# Patient Record
Sex: FEMALE | Race: WHITE | HISPANIC OR LATINO | URBAN - METROPOLITAN AREA
[De-identification: names, ages, dates, MRNs, and addresses within clinical notes are randomized per-mention and may not be internally consistent; named-entity substitution may affect disease eponyms.]

---

## 2017-01-10 ENCOUNTER — OUTPATIENT (OUTPATIENT)
Dept: OUTPATIENT SERVICES | Facility: HOSPITAL | Age: 47
LOS: 1 days | End: 2017-01-10
Payer: COMMERCIAL

## 2017-01-10 ENCOUNTER — APPOINTMENT (OUTPATIENT)
Dept: CT IMAGING | Facility: HOSPITAL | Age: 47
End: 2017-01-10

## 2017-01-10 DIAGNOSIS — K40.90 UNILATERAL INGUINAL HERNIA, WITHOUT OBSTRUCTION OR GANGRENE, NOT SPECIFIED AS RECURRENT: Chronic | ICD-10-CM

## 2017-01-10 DIAGNOSIS — J34.89 OTHER SPECIFIED DISORDERS OF NOSE AND NASAL SINUSES: ICD-10-CM

## 2017-01-10 PROCEDURE — 70491 CT SOFT TISSUE NECK W/DYE: CPT

## 2017-01-10 PROCEDURE — 70486 CT MAXILLOFACIAL W/O DYE: CPT

## 2017-03-17 ENCOUNTER — APPOINTMENT (OUTPATIENT)
Dept: ULTRASOUND IMAGING | Facility: HOSPITAL | Age: 47
End: 2017-03-17

## 2017-03-17 ENCOUNTER — OUTPATIENT (OUTPATIENT)
Dept: OUTPATIENT SERVICES | Facility: HOSPITAL | Age: 47
LOS: 1 days | End: 2017-03-17
Payer: COMMERCIAL

## 2017-03-17 DIAGNOSIS — K40.90 UNILATERAL INGUINAL HERNIA, WITHOUT OBSTRUCTION OR GANGRENE, NOT SPECIFIED AS RECURRENT: Chronic | ICD-10-CM

## 2017-03-17 DIAGNOSIS — E04.1 NONTOXIC SINGLE THYROID NODULE: ICD-10-CM

## 2017-03-17 PROCEDURE — 76536 US EXAM OF HEAD AND NECK: CPT

## 2018-01-19 ENCOUNTER — OUTPATIENT (OUTPATIENT)
Dept: OUTPATIENT SERVICES | Facility: HOSPITAL | Age: 48
LOS: 1 days | End: 2018-01-19
Payer: COMMERCIAL

## 2018-01-19 ENCOUNTER — APPOINTMENT (OUTPATIENT)
Dept: ULTRASOUND IMAGING | Facility: HOSPITAL | Age: 48
End: 2018-01-19
Payer: COMMERCIAL

## 2018-01-19 ENCOUNTER — APPOINTMENT (OUTPATIENT)
Dept: MAMMOGRAPHY | Facility: HOSPITAL | Age: 48
End: 2018-01-19
Payer: COMMERCIAL

## 2018-01-19 DIAGNOSIS — K40.90 UNILATERAL INGUINAL HERNIA, WITHOUT OBSTRUCTION OR GANGRENE, NOT SPECIFIED AS RECURRENT: Chronic | ICD-10-CM

## 2018-01-19 DIAGNOSIS — R92.2 INCONCLUSIVE MAMMOGRAM: ICD-10-CM

## 2018-01-19 DIAGNOSIS — Z00.8 ENCOUNTER FOR OTHER GENERAL EXAMINATION: ICD-10-CM

## 2018-01-19 PROCEDURE — 77066 DX MAMMO INCL CAD BI: CPT | Mod: 26

## 2018-01-19 PROCEDURE — 77066 DX MAMMO INCL CAD BI: CPT

## 2018-01-19 PROCEDURE — G0279: CPT | Mod: 26

## 2018-01-19 PROCEDURE — G0279: CPT

## 2018-01-19 PROCEDURE — 76641 ULTRASOUND BREAST COMPLETE: CPT | Mod: 26

## 2018-01-19 PROCEDURE — 76641 ULTRASOUND BREAST COMPLETE: CPT

## 2018-01-31 ENCOUNTER — RESULT REVIEW (OUTPATIENT)
Age: 48
End: 2018-01-31

## 2018-01-31 ENCOUNTER — OUTPATIENT (OUTPATIENT)
Dept: OUTPATIENT SERVICES | Facility: HOSPITAL | Age: 48
LOS: 1 days | End: 2018-01-31
Payer: COMMERCIAL

## 2018-01-31 DIAGNOSIS — K40.90 UNILATERAL INGUINAL HERNIA, WITHOUT OBSTRUCTION OR GANGRENE, NOT SPECIFIED AS RECURRENT: Chronic | ICD-10-CM

## 2018-01-31 DIAGNOSIS — R92.8 OTHER ABNORMAL AND INCONCLUSIVE FINDINGS ON DIAGNOSTIC IMAGING OF BREAST: ICD-10-CM

## 2018-01-31 DIAGNOSIS — N63.0 UNSPECIFIED LUMP IN UNSPECIFIED BREAST: ICD-10-CM

## 2018-01-31 PROCEDURE — 19083 BX BREAST 1ST LESION US IMAG: CPT

## 2018-01-31 PROCEDURE — A4648: CPT

## 2018-01-31 PROCEDURE — 88305 TISSUE EXAM BY PATHOLOGIST: CPT

## 2018-01-31 PROCEDURE — 19083 BX BREAST 1ST LESION US IMAG: CPT | Mod: LT

## 2018-01-31 PROCEDURE — 88305 TISSUE EXAM BY PATHOLOGIST: CPT | Mod: 26

## 2018-02-08 ENCOUNTER — EMERGENCY (EMERGENCY)
Facility: HOSPITAL | Age: 48
LOS: 1 days | Discharge: HOME | End: 2018-02-08
Admitting: EMERGENCY MEDICINE
Payer: COMMERCIAL

## 2018-02-08 DIAGNOSIS — Z98.890 OTHER SPECIFIED POSTPROCEDURAL STATES: ICD-10-CM

## 2018-02-08 DIAGNOSIS — M54.6 PAIN IN THORACIC SPINE: ICD-10-CM

## 2018-02-08 DIAGNOSIS — Z87.19 PERSONAL HISTORY OF OTHER DISEASES OF THE DIGESTIVE SYSTEM: ICD-10-CM

## 2018-02-08 DIAGNOSIS — K21.9 GASTRO-ESOPHAGEAL REFLUX DISEASE WITHOUT ESOPHAGITIS: ICD-10-CM

## 2018-02-08 DIAGNOSIS — K40.90 UNILATERAL INGUINAL HERNIA, WITHOUT OBSTRUCTION OR GANGRENE, NOT SPECIFIED AS RECURRENT: Chronic | ICD-10-CM

## 2018-02-08 DIAGNOSIS — Z88.8 ALLERGY STATUS TO OTHER DRUGS, MEDICAMENTS AND BIOLOGICAL SUBSTANCES: ICD-10-CM

## 2018-02-08 DIAGNOSIS — Z79.899 OTHER LONG TERM (CURRENT) DRUG THERAPY: ICD-10-CM

## 2018-02-08 DIAGNOSIS — Z78.9 OTHER SPECIFIED HEALTH STATUS: ICD-10-CM

## 2018-02-08 DIAGNOSIS — R07.89 OTHER CHEST PAIN: ICD-10-CM

## 2018-02-08 PROCEDURE — 99285 EMERGENCY DEPT VISIT HI MDM: CPT | Mod: 25

## 2018-02-08 PROCEDURE — 71046 X-RAY EXAM CHEST 2 VIEWS: CPT | Mod: 26

## 2018-02-08 PROCEDURE — 93010 ELECTROCARDIOGRAM REPORT: CPT

## 2018-02-09 PROCEDURE — 85730 THROMBOPLASTIN TIME PARTIAL: CPT

## 2018-02-09 PROCEDURE — 93005 ELECTROCARDIOGRAM TRACING: CPT

## 2018-02-09 PROCEDURE — 83690 ASSAY OF LIPASE: CPT

## 2018-02-09 PROCEDURE — 71046 X-RAY EXAM CHEST 2 VIEWS: CPT

## 2018-02-09 PROCEDURE — 96374 THER/PROPH/DIAG INJ IV PUSH: CPT

## 2018-02-09 PROCEDURE — 96361 HYDRATE IV INFUSION ADD-ON: CPT

## 2018-02-09 PROCEDURE — 80053 COMPREHEN METABOLIC PANEL: CPT

## 2018-02-09 PROCEDURE — 85027 COMPLETE CBC AUTOMATED: CPT

## 2018-02-09 PROCEDURE — 84484 ASSAY OF TROPONIN QUANT: CPT

## 2018-02-09 PROCEDURE — 99284 EMERGENCY DEPT VISIT MOD MDM: CPT | Mod: 25

## 2018-02-09 PROCEDURE — 82553 CREATINE MB FRACTION: CPT

## 2018-02-09 PROCEDURE — 85610 PROTHROMBIN TIME: CPT

## 2018-08-01 ENCOUNTER — APPOINTMENT (OUTPATIENT)
Dept: ULTRASOUND IMAGING | Facility: HOSPITAL | Age: 48
End: 2018-08-01
Payer: COMMERCIAL

## 2018-08-01 ENCOUNTER — OUTPATIENT (OUTPATIENT)
Dept: OUTPATIENT SERVICES | Facility: HOSPITAL | Age: 48
LOS: 1 days | End: 2018-08-01
Payer: COMMERCIAL

## 2018-08-01 DIAGNOSIS — Z00.8 ENCOUNTER FOR OTHER GENERAL EXAMINATION: ICD-10-CM

## 2018-08-01 DIAGNOSIS — K40.90 UNILATERAL INGUINAL HERNIA, WITHOUT OBSTRUCTION OR GANGRENE, NOT SPECIFIED AS RECURRENT: Chronic | ICD-10-CM

## 2018-08-01 PROCEDURE — 76642 ULTRASOUND BREAST LIMITED: CPT | Mod: 26,LT

## 2018-08-01 PROCEDURE — 76642 ULTRASOUND BREAST LIMITED: CPT

## 2018-08-17 ENCOUNTER — RESULT REVIEW (OUTPATIENT)
Age: 48
End: 2018-08-17

## 2018-08-17 ENCOUNTER — OUTPATIENT (OUTPATIENT)
Dept: OUTPATIENT SERVICES | Facility: HOSPITAL | Age: 48
LOS: 1 days | End: 2018-08-17
Payer: COMMERCIAL

## 2018-08-17 ENCOUNTER — APPOINTMENT (OUTPATIENT)
Dept: ULTRASOUND IMAGING | Facility: CLINIC | Age: 48
End: 2018-08-17
Payer: COMMERCIAL

## 2018-08-17 DIAGNOSIS — Z00.8 ENCOUNTER FOR OTHER GENERAL EXAMINATION: ICD-10-CM

## 2018-08-17 DIAGNOSIS — K40.90 UNILATERAL INGUINAL HERNIA, WITHOUT OBSTRUCTION OR GANGRENE, NOT SPECIFIED AS RECURRENT: Chronic | ICD-10-CM

## 2018-08-17 DIAGNOSIS — N63.20 UNSPECIFIED LUMP IN THE LEFT BREAST, UNSPECIFIED QUADRANT: ICD-10-CM

## 2018-08-17 PROCEDURE — 19083 BX BREAST 1ST LESION US IMAG: CPT | Mod: LT

## 2018-08-17 PROCEDURE — A4648: CPT

## 2018-08-17 PROCEDURE — 77065 DX MAMMO INCL CAD UNI: CPT | Mod: 26,LT

## 2018-08-17 PROCEDURE — 19083 BX BREAST 1ST LESION US IMAG: CPT

## 2018-08-17 PROCEDURE — 77065 DX MAMMO INCL CAD UNI: CPT

## 2018-10-11 ENCOUNTER — RESULT REVIEW (OUTPATIENT)
Age: 48
End: 2018-10-11

## 2019-01-15 ENCOUNTER — RESULT REVIEW (OUTPATIENT)
Age: 49
End: 2019-01-15

## 2019-02-01 ENCOUNTER — OUTPATIENT (OUTPATIENT)
Dept: OUTPATIENT SERVICES | Facility: HOSPITAL | Age: 49
LOS: 1 days | End: 2019-02-01
Payer: COMMERCIAL

## 2019-02-01 ENCOUNTER — APPOINTMENT (OUTPATIENT)
Dept: ULTRASOUND IMAGING | Facility: HOSPITAL | Age: 49
End: 2019-02-01
Payer: COMMERCIAL

## 2019-02-01 ENCOUNTER — APPOINTMENT (OUTPATIENT)
Dept: MAMMOGRAPHY | Facility: HOSPITAL | Age: 49
End: 2019-02-01
Payer: COMMERCIAL

## 2019-02-01 DIAGNOSIS — Z00.8 ENCOUNTER FOR OTHER GENERAL EXAMINATION: ICD-10-CM

## 2019-02-01 DIAGNOSIS — K40.90 UNILATERAL INGUINAL HERNIA, WITHOUT OBSTRUCTION OR GANGRENE, NOT SPECIFIED AS RECURRENT: Chronic | ICD-10-CM

## 2019-02-01 PROCEDURE — 76641 ULTRASOUND BREAST COMPLETE: CPT

## 2019-02-01 PROCEDURE — 76830 TRANSVAGINAL US NON-OB: CPT | Mod: 26

## 2019-02-01 PROCEDURE — 76856 US EXAM PELVIC COMPLETE: CPT | Mod: 26

## 2019-02-01 PROCEDURE — 76830 TRANSVAGINAL US NON-OB: CPT

## 2019-02-01 PROCEDURE — 77066 DX MAMMO INCL CAD BI: CPT | Mod: 26

## 2019-02-01 PROCEDURE — G0279: CPT | Mod: 26

## 2019-02-01 PROCEDURE — 76856 US EXAM PELVIC COMPLETE: CPT

## 2019-02-01 PROCEDURE — G0279: CPT

## 2019-02-01 PROCEDURE — 77066 DX MAMMO INCL CAD BI: CPT

## 2019-02-01 PROCEDURE — 76641 ULTRASOUND BREAST COMPLETE: CPT | Mod: 26

## 2020-01-20 ENCOUNTER — RESULT REVIEW (OUTPATIENT)
Age: 50
End: 2020-01-20

## 2020-01-22 ENCOUNTER — EMERGENCY (EMERGENCY)
Facility: HOSPITAL | Age: 50
LOS: 1 days | Discharge: HOME | End: 2020-01-22
Attending: EMERGENCY MEDICINE | Admitting: EMERGENCY MEDICINE
Payer: COMMERCIAL

## 2020-01-22 VITALS
DIASTOLIC BLOOD PRESSURE: 71 MMHG | HEART RATE: 78 BPM | SYSTOLIC BLOOD PRESSURE: 148 MMHG | HEIGHT: 64 IN | TEMPERATURE: 98 F | OXYGEN SATURATION: 98 % | RESPIRATION RATE: 16 BRPM | WEIGHT: 130.73 LBS

## 2020-01-22 DIAGNOSIS — K40.90 UNILATERAL INGUINAL HERNIA, WITHOUT OBSTRUCTION OR GANGRENE, NOT SPECIFIED AS RECURRENT: Chronic | ICD-10-CM

## 2020-01-22 LAB
APPEARANCE UR: CLEAR — SIGNIFICANT CHANGE UP
BACTERIA # UR AUTO: ABNORMAL /HPF
BILIRUB UR-MCNC: NEGATIVE — SIGNIFICANT CHANGE UP
COLOR SPEC: YELLOW — SIGNIFICANT CHANGE UP
COMMENT - URINE: SIGNIFICANT CHANGE UP
DIFF PNL FLD: ABNORMAL
EPI CELLS # UR: SIGNIFICANT CHANGE UP
GLUCOSE UR QL: NEGATIVE — SIGNIFICANT CHANGE UP
KETONES UR-MCNC: NEGATIVE — SIGNIFICANT CHANGE UP
LEUKOCYTE ESTERASE UR-ACNC: ABNORMAL
NITRITE UR-MCNC: NEGATIVE — SIGNIFICANT CHANGE UP
PH UR: 7 — SIGNIFICANT CHANGE UP (ref 5–8)
PROT UR-MCNC: NEGATIVE — SIGNIFICANT CHANGE UP
RBC CASTS # UR COMP ASSIST: ABNORMAL /HPF (ref 0–4)
SP GR SPEC: 1 — LOW (ref 1.01–1.02)
UROBILINOGEN FLD QL: NEGATIVE — SIGNIFICANT CHANGE UP
WBC UR QL: ABNORMAL /HPF (ref 0–5)

## 2020-01-22 PROCEDURE — 99283 EMERGENCY DEPT VISIT LOW MDM: CPT

## 2020-01-22 PROCEDURE — 99284 EMERGENCY DEPT VISIT MOD MDM: CPT

## 2020-01-22 PROCEDURE — 81001 URINALYSIS AUTO W/SCOPE: CPT

## 2020-01-22 PROCEDURE — 87086 URINE CULTURE/COLONY COUNT: CPT

## 2020-01-22 PROCEDURE — 87186 SC STD MICRODIL/AGAR DIL: CPT

## 2020-01-22 RX ORDER — PHENAZOPYRIDINE HCL 100 MG
1 TABLET ORAL
Qty: 6 | Refills: 0
Start: 2020-01-22 | End: 2020-01-23

## 2020-01-22 RX ORDER — PHENAZOPYRIDINE HCL 100 MG
100 TABLET ORAL ONCE
Refills: 0 | Status: COMPLETED | OUTPATIENT
Start: 2020-01-22 | End: 2020-01-22

## 2020-01-22 RX ADMIN — Medication 1 TABLET(S): at 19:34

## 2020-01-22 RX ADMIN — Medication 100 MILLIGRAM(S): at 19:34

## 2020-01-22 NOTE — ED PROVIDER NOTE - OBJECTIVE STATEMENT
49 year old female, PMHx of spinal surgery, chronic low back pain; presents to the ED complaining of dysuria x 2 days. Patient states she has been having dysuria for the last two days. Since this morning, she has had suprapubic pain, increased urinary frequency, and intermittent hematuria prompting her to come to the ED for evaluation. Pt denies fevers, chills, nausea, vomiting, diarrhea, new/changed back pain, or other complaints.

## 2020-01-22 NOTE — ED ADULT NURSE NOTE - OBJECTIVE STATEMENT
Pt arrived to the ER c/o urinary symptoms. Pt reports yesterday she began having pain and burning on urination. Pt denies fever, chills, nausea, vomiting, and diarrhea. Pt c/o lower abdominal pain that radiates to the back.

## 2020-01-22 NOTE — ED ADULT NURSE NOTE - NSIMPLEMENTINTERV_GEN_ALL_ED
Implemented All Universal Safety Interventions:  [place] to call system. Call bell, personal items and telephone within reach. Instruct patient to call for assistance. Room bathroom lighting operational. Non-slip footwear when patient is off stretcher. Physically safe environment: no spills, clutter or unnecessary equipment. Stretcher in lowest position, wheels locked, appropriate side rails in place.

## 2020-01-22 NOTE — ED PROVIDER NOTE - NSFOLLOWUPINSTRUCTIONS_ED_ALL_ED_FT
Rest, drink plenty of fluids  Take Bactrim DS two times a day for 5 days  Take Pyridium as directed  Take OTC Motrin or Tylenol as directed, as needed for pain  Follow up with your PMD 2-3 days  Return to the ER for worsening

## 2020-01-22 NOTE — ED PROVIDER NOTE - PATIENT PORTAL LINK FT
You can access the FollowMyHealth Patient Portal offered by [facility] by registering at the following website: [email or web address]. By joining [employer]’s FollowMyHealth portal, you will also be able to view your health information using other applications (apps) compatible with our system.

## 2020-01-24 NOTE — ED PROVIDER NOTE - CLINICAL SUMMARY MEDICAL DECISION MAKING FREE TEXT BOX
derek all pertinent systems normal 49 year old female pw dysuria, hematuria and suprapubic pain- will check ua/uc- no evidence of pyelo, start abx, advised on pending culture Daisy: 49 year old female pw dysuria, hematuria and suprapubic pain- will check ua/uc- no evidence of pyelo, start abx, advised on pending culture

## 2020-01-27 ENCOUNTER — APPOINTMENT (OUTPATIENT)
Dept: ULTRASOUND IMAGING | Facility: HOSPITAL | Age: 50
End: 2020-01-27

## 2020-01-27 ENCOUNTER — OUTPATIENT (OUTPATIENT)
Dept: OUTPATIENT SERVICES | Facility: HOSPITAL | Age: 50
LOS: 1 days | End: 2020-01-27
Payer: COMMERCIAL

## 2020-01-27 DIAGNOSIS — Z00.8 ENCOUNTER FOR OTHER GENERAL EXAMINATION: ICD-10-CM

## 2020-01-27 DIAGNOSIS — K40.90 UNILATERAL INGUINAL HERNIA, WITHOUT OBSTRUCTION OR GANGRENE, NOT SPECIFIED AS RECURRENT: Chronic | ICD-10-CM

## 2020-01-27 PROCEDURE — 76830 TRANSVAGINAL US NON-OB: CPT | Mod: 26

## 2020-01-27 PROCEDURE — 76856 US EXAM PELVIC COMPLETE: CPT

## 2020-01-27 PROCEDURE — 76856 US EXAM PELVIC COMPLETE: CPT | Mod: 26

## 2020-01-27 PROCEDURE — 76830 TRANSVAGINAL US NON-OB: CPT

## 2020-03-05 ENCOUNTER — OUTPATIENT (OUTPATIENT)
Dept: OUTPATIENT SERVICES | Facility: HOSPITAL | Age: 50
LOS: 1 days | End: 2020-03-05
Payer: COMMERCIAL

## 2020-03-05 ENCOUNTER — APPOINTMENT (OUTPATIENT)
Dept: MAMMOGRAPHY | Facility: HOSPITAL | Age: 50
End: 2020-03-05
Payer: COMMERCIAL

## 2020-03-05 DIAGNOSIS — K40.90 UNILATERAL INGUINAL HERNIA, WITHOUT OBSTRUCTION OR GANGRENE, NOT SPECIFIED AS RECURRENT: Chronic | ICD-10-CM

## 2020-03-05 DIAGNOSIS — Z12.31 ENCOUNTER FOR SCREENING MAMMOGRAM FOR MALIGNANT NEOPLASM OF BREAST: ICD-10-CM

## 2020-03-05 PROCEDURE — 77067 SCR MAMMO BI INCL CAD: CPT

## 2020-03-05 PROCEDURE — 77067 SCR MAMMO BI INCL CAD: CPT | Mod: 26

## 2020-03-05 PROCEDURE — 77063 BREAST TOMOSYNTHESIS BI: CPT

## 2020-03-05 PROCEDURE — 77063 BREAST TOMOSYNTHESIS BI: CPT | Mod: 26

## 2020-03-06 ENCOUNTER — APPOINTMENT (OUTPATIENT)
Dept: ULTRASOUND IMAGING | Facility: HOSPITAL | Age: 50
End: 2020-03-06

## 2021-09-30 ENCOUNTER — RESULT REVIEW (OUTPATIENT)
Age: 51
End: 2021-09-30

## 2021-11-02 ENCOUNTER — EMERGENCY (EMERGENCY)
Facility: HOSPITAL | Age: 51
LOS: 1 days | Discharge: HOME | End: 2021-11-02
Attending: EMERGENCY MEDICINE | Admitting: EMERGENCY MEDICINE
Payer: COMMERCIAL

## 2021-11-02 VITALS
HEART RATE: 89 BPM | HEIGHT: 64 IN | DIASTOLIC BLOOD PRESSURE: 61 MMHG | WEIGHT: 134.92 LBS | TEMPERATURE: 98 F | RESPIRATION RATE: 22 BRPM | OXYGEN SATURATION: 99 % | SYSTOLIC BLOOD PRESSURE: 106 MMHG

## 2021-11-02 VITALS
OXYGEN SATURATION: 98 % | HEART RATE: 95 BPM | DIASTOLIC BLOOD PRESSURE: 59 MMHG | SYSTOLIC BLOOD PRESSURE: 107 MMHG | RESPIRATION RATE: 18 BRPM

## 2021-11-02 DIAGNOSIS — K40.90 UNILATERAL INGUINAL HERNIA, WITHOUT OBSTRUCTION OR GANGRENE, NOT SPECIFIED AS RECURRENT: Chronic | ICD-10-CM

## 2021-11-02 PROCEDURE — 99284 EMERGENCY DEPT VISIT MOD MDM: CPT

## 2021-11-02 PROCEDURE — 99283 EMERGENCY DEPT VISIT LOW MDM: CPT

## 2021-11-02 PROCEDURE — 93010 ELECTROCARDIOGRAM REPORT: CPT

## 2021-11-02 PROCEDURE — 93005 ELECTROCARDIOGRAM TRACING: CPT

## 2021-11-02 NOTE — ED ADULT TRIAGE NOTE - NSTRIAGECARE_GEN_A_ER
Cellulitis and partial thickness wound of left leg  Continue wound care - bacitracin ointment   Elevated wbc count   CT reportedly pending .   OR in am if no improvement, worsening  / further elevation in wbc   Aggressive elevation Face Mask Cellulitis and partial thickness wound of left leg  Continue wound care - bacitracin ointment   Elevated wbc count   CT reportedly pending .   OR exploration if no improvement, worsening  / further elevation in wbc   Aggressive elevation Cellulitis and partial thickness wound of left leg  Continue wound care - bacitracin ointment, Adaptic and dry wrap   change outer dressing when wet   Elevated wbc count   CT reportedly pending .   OR exploration if no improvement, worsening  / further elevation in wbc   Aggressive elevation

## 2021-11-02 NOTE — ED ADULT NURSE NOTE - OBJECTIVE STATEMENT
Patient BIB EMS from home where she reportedly as per EMS took two 500mg THC gummies. Patient initially unresponsive and not exhibiting eye opening to sternal rub. However, pupils equal and reactive and tracking when eyes opened by RN for exam. Patient began responding purposefully at 1830 upon arrival of MD, stating that she felt adventurous today and took two "pot gummies" for fun with her . Patient states she feels very anxious and afraid she will have a stroke. Of note, received 2 Narcan en route, 4 Zofran IV via an EMS 18G placed to RAC. Patient received 500cc NS bolus. Alert and oriented, no trauma noted.

## 2021-11-02 NOTE — ED PROVIDER NOTE - PATIENT PORTAL LINK FT
You can access the FollowMyHealth Patient Portal offered by [facility] by registering at the following website: [email or web address]. By joining [employer]’s FollowMyHealth portal, you will also be able to view your health information using other applications (apps) compatible with our system.

## 2021-11-02 NOTE — ED PROVIDER NOTE - OBJECTIVE STATEMENT
Patient ingested 2 Gumma THC . Became lethargic . Patient ingested 2 Gummy THC candies . Became lethargic .

## 2021-11-02 NOTE — ED ADULT NURSE NOTE - NSIMPLEMENTINTERV_GEN_ALL_ED
Implemented All Fall Risk Interventions:  [place] to call system. Call bell, personal items and telephone within reach. Instruct patient to call for assistance. Room bathroom lighting operational. Non-slip footwear when patient is off stretcher. Physically safe environment: no spills, clutter or unnecessary equipment. Stretcher in lowest position, wheels locked, appropriate side rails in place. Provide visual cue, wrist band, yellow gown, etc. Monitor gait and stability. Monitor for mental status changes and reorient to person, place, and time. Review medications for side effects contributing to fall risk. Reinforce activity limits and safety measures with patient and family.

## 2021-11-02 NOTE — ED ADULT TRIAGE NOTE - CHIEF COMPLAINT QUOTE
pt BIB EMS unresponsive after taking 2 THC gummies 500 mg each at 4 pm today. pt given 2mg of Narcan and 4 mg of zofran IV.  in route.

## 2021-11-02 NOTE — ED PROVIDER NOTE - RESPIRATORY, MLM
Addended by: Shiva Yang on: 10/9/2017 09:09 AM     Modules accepted: Orders
Breath sounds clear and equal bilaterally.

## 2022-05-26 ENCOUNTER — APPOINTMENT (OUTPATIENT)
Dept: NEUROLOGY | Facility: CLINIC | Age: 52
End: 2022-05-26

## 2022-08-04 ENCOUNTER — APPOINTMENT (OUTPATIENT)
Dept: NEUROLOGY | Facility: CLINIC | Age: 52
End: 2022-08-04

## 2022-08-04 VITALS
SYSTOLIC BLOOD PRESSURE: 126 MMHG | BODY MASS INDEX: 21.68 KG/M2 | DIASTOLIC BLOOD PRESSURE: 76 MMHG | HEART RATE: 82 BPM | WEIGHT: 127 LBS | HEIGHT: 64 IN

## 2022-08-04 DIAGNOSIS — G89.29 HEADACHE, UNSPECIFIED: ICD-10-CM

## 2022-08-04 DIAGNOSIS — Z78.9 OTHER SPECIFIED HEALTH STATUS: ICD-10-CM

## 2022-08-04 DIAGNOSIS — G89.29 PAIN, UNSPECIFIED: ICD-10-CM

## 2022-08-04 DIAGNOSIS — R52 PAIN, UNSPECIFIED: ICD-10-CM

## 2022-08-04 DIAGNOSIS — R51.9 HEADACHE, UNSPECIFIED: ICD-10-CM

## 2022-08-04 DIAGNOSIS — J45.909 UNSPECIFIED ASTHMA, UNCOMPLICATED: ICD-10-CM

## 2022-08-04 PROCEDURE — 99204 OFFICE O/P NEW MOD 45 MIN: CPT

## 2022-08-04 RX ORDER — MONTELUKAST 10 MG/1
10 TABLET, FILM COATED ORAL
Qty: 90 | Refills: 0 | Status: ACTIVE | COMMUNITY
Start: 2022-02-05

## 2022-08-04 RX ORDER — TIZANIDINE HYDROCHLORIDE 4 MG/1
4 CAPSULE ORAL
Qty: 60 | Refills: 0 | Status: ACTIVE | COMMUNITY
Start: 2022-07-21

## 2022-08-04 NOTE — HISTORY OF PRESENT ILLNESS
[FreeTextEntry1] : This patient is seen for an office consultation.  She presents with her niece who interprets.  The patient speaks English/Mauritian.  She is a 51-year-old female who describes chronic headache for approximately the last 8 to 9 months.  Headaches described as generalized and is poorly characterized.  There is a mild associated dizziness without true vertigo.  There is some occasional nausea.  She will take ibuprofen as needed.  That he can occur at anytime of the day.  There is no definite pattern.  It is important to note that the headache is moderated recently and now is significantly less frequent.  It is unclear if she has had imaging.  She believes she may have had a brain CAT scan performed by Dr. Miranda her PCP but this is not definite. \par \par This patient also has chronic cervical lumbar pain and she is status post lumbar laminectomy approximately 4 years ago.  She follows with pain management and she takes oxycodone 15 mg twice daily as needed and tizanidine 4 mg twice daily as needed.  This would be in addition to the Advil that she takes as needed.  Also she receives Singulair for asthma.\par \par There are no prior records available regarding pain management prior lumbar surgery and any imaging.  There were no laboratory tests available.

## 2022-08-04 NOTE — PHYSICAL EXAM
[FreeTextEntry1] : Head:  Normocephalic.  Neck: Mild restriction diffusely tender.  Spine: Lower lumbar tenderness restricts forward bending and straight leg raising is restricted bilaterally.\par \par Mental Status:  Alert Oriented X3 Speech normal and no aphasia or dysarthria.\par \par Cranial Nerves:  PERRL, Fundi normal Visual Fields full  EOMI no diplopia no ptosis no nystagmus, V through XII intact.\par \par Motor:  No drift, normal strength tone and coordination and no focal atrophy. No abnormal movements. No dysmetria.  Normal rapid alternating movements. \par \par DTRs: Symmetric 1-2+ absent at the ankles..  Plantars flexor.  No Clonus.\par \par Sensory:  Normal testing with pin light touch and vibration and  Joint position sense. \par \par Gait: Regular.\par

## 2022-08-04 NOTE — ASSESSMENT
[FreeTextEntry1] : Impression: This 51-year-old female patient has a chronic headache disorder for approximately the last 8 to 9 months which is poorly characterized and has moderated.  Neurological exam is intact in this regard.  She has a chronic multifocal pain syndrome affecting neck and lower back status post lumbar laminectomy for which she follows with pain management is treated with narcotic analgesics.  The cause chronic headache disorder is unclear and is noted there is improvement in the neurological exam is unremarkable.\par \par Recommendations: Obtain MRI of the brain noncontrast.  Discussed with the patient's niece who is available to interpret.  Office follow-up as directed.\par

## 2022-08-10 ENCOUNTER — OUTPATIENT (OUTPATIENT)
Dept: OUTPATIENT SERVICES | Facility: HOSPITAL | Age: 52
LOS: 1 days | End: 2022-08-10
Payer: COMMERCIAL

## 2022-08-10 ENCOUNTER — APPOINTMENT (OUTPATIENT)
Dept: MRI IMAGING | Facility: HOSPITAL | Age: 52
End: 2022-08-10

## 2022-08-10 ENCOUNTER — RESULT REVIEW (OUTPATIENT)
Age: 52
End: 2022-08-10

## 2022-08-10 DIAGNOSIS — Z00.8 ENCOUNTER FOR OTHER GENERAL EXAMINATION: ICD-10-CM

## 2022-08-10 DIAGNOSIS — K40.90 UNILATERAL INGUINAL HERNIA, WITHOUT OBSTRUCTION OR GANGRENE, NOT SPECIFIED AS RECURRENT: Chronic | ICD-10-CM

## 2022-08-10 PROCEDURE — 70551 MRI BRAIN STEM W/O DYE: CPT

## 2022-08-10 PROCEDURE — 70551 MRI BRAIN STEM W/O DYE: CPT | Mod: 26

## 2022-08-29 ENCOUNTER — APPOINTMENT (OUTPATIENT)
Dept: RADIOLOGY | Facility: HOSPITAL | Age: 52
End: 2022-08-29

## 2022-08-29 ENCOUNTER — APPOINTMENT (OUTPATIENT)
Dept: MAMMOGRAPHY | Facility: HOSPITAL | Age: 52
End: 2022-08-29

## 2022-08-29 ENCOUNTER — OUTPATIENT (OUTPATIENT)
Dept: OUTPATIENT SERVICES | Facility: HOSPITAL | Age: 52
LOS: 1 days | End: 2022-08-29
Payer: COMMERCIAL

## 2022-08-29 DIAGNOSIS — Z00.8 ENCOUNTER FOR OTHER GENERAL EXAMINATION: ICD-10-CM

## 2022-08-29 DIAGNOSIS — K40.90 UNILATERAL INGUINAL HERNIA, WITHOUT OBSTRUCTION OR GANGRENE, NOT SPECIFIED AS RECURRENT: Chronic | ICD-10-CM

## 2022-08-29 PROCEDURE — 77080 DXA BONE DENSITY AXIAL: CPT

## 2022-08-29 PROCEDURE — 77067 SCR MAMMO BI INCL CAD: CPT | Mod: 26

## 2022-08-29 PROCEDURE — 77063 BREAST TOMOSYNTHESIS BI: CPT

## 2022-08-29 PROCEDURE — 77067 SCR MAMMO BI INCL CAD: CPT

## 2022-08-29 PROCEDURE — 77063 BREAST TOMOSYNTHESIS BI: CPT | Mod: 26

## 2022-08-29 PROCEDURE — 77080 DXA BONE DENSITY AXIAL: CPT | Mod: 26

## 2022-11-03 ENCOUNTER — RESULT REVIEW (OUTPATIENT)
Age: 52
End: 2022-11-03

## 2022-11-07 ENCOUNTER — APPOINTMENT (OUTPATIENT)
Dept: CT IMAGING | Facility: HOSPITAL | Age: 52
End: 2022-11-07

## 2022-11-07 ENCOUNTER — OUTPATIENT (OUTPATIENT)
Dept: OUTPATIENT SERVICES | Facility: HOSPITAL | Age: 52
LOS: 1 days | End: 2022-11-07
Payer: COMMERCIAL

## 2022-11-07 DIAGNOSIS — K40.90 UNILATERAL INGUINAL HERNIA, WITHOUT OBSTRUCTION OR GANGRENE, NOT SPECIFIED AS RECURRENT: Chronic | ICD-10-CM

## 2022-11-07 DIAGNOSIS — Z00.8 ENCOUNTER FOR OTHER GENERAL EXAMINATION: ICD-10-CM

## 2022-11-07 PROCEDURE — 74150 CT ABDOMEN W/O CONTRAST: CPT

## 2022-11-07 PROCEDURE — 74150 CT ABDOMEN W/O CONTRAST: CPT | Mod: 26

## 2022-12-02 ENCOUNTER — OUTPATIENT (OUTPATIENT)
Dept: OUTPATIENT SERVICES | Facility: HOSPITAL | Age: 52
LOS: 1 days | End: 2022-12-02
Payer: COMMERCIAL

## 2022-12-02 ENCOUNTER — APPOINTMENT (OUTPATIENT)
Dept: CT IMAGING | Facility: HOSPITAL | Age: 52
End: 2022-12-02

## 2022-12-02 DIAGNOSIS — K40.90 UNILATERAL INGUINAL HERNIA, WITHOUT OBSTRUCTION OR GANGRENE, NOT SPECIFIED AS RECURRENT: Chronic | ICD-10-CM

## 2022-12-02 DIAGNOSIS — Z00.8 ENCOUNTER FOR OTHER GENERAL EXAMINATION: ICD-10-CM

## 2022-12-02 PROCEDURE — 74160 CT ABDOMEN W/CONTRAST: CPT

## 2022-12-02 PROCEDURE — 74160 CT ABDOMEN W/CONTRAST: CPT | Mod: 26

## 2023-01-04 ENCOUNTER — APPOINTMENT (OUTPATIENT)
Dept: ULTRASOUND IMAGING | Facility: HOSPITAL | Age: 53
End: 2023-01-04

## 2023-01-27 ENCOUNTER — NON-APPOINTMENT (OUTPATIENT)
Age: 53
End: 2023-01-27

## 2023-01-27 DIAGNOSIS — Z92.89 PERSONAL HISTORY OF OTHER MEDICAL TREATMENT: ICD-10-CM

## 2023-01-27 DIAGNOSIS — D25.9 LEIOMYOMA OF UTERUS, UNSPECIFIED: ICD-10-CM

## 2023-01-27 DIAGNOSIS — Z98.890 OTHER SPECIFIED POSTPROCEDURAL STATES: ICD-10-CM

## 2023-01-27 DIAGNOSIS — N95.2 POSTMENOPAUSAL ATROPHIC VAGINITIS: ICD-10-CM

## 2023-01-27 DIAGNOSIS — R92.2 INCONCLUSIVE MAMMOGRAM: ICD-10-CM

## 2023-10-13 NOTE — ED PROVIDER NOTE - BIRTH SEX
Quality 226: Preventive Care And Screening: Tobacco Use: Screening And Cessation Intervention: Patient screened for tobacco use and is an ex/non-smoker
Detail Level: Detailed
Female

## 2023-12-26 ENCOUNTER — APPOINTMENT (OUTPATIENT)
Dept: UROLOGY | Facility: CLINIC | Age: 53
End: 2023-12-26
Payer: COMMERCIAL

## 2023-12-26 VITALS
HEIGHT: 64 IN | SYSTOLIC BLOOD PRESSURE: 118 MMHG | WEIGHT: 127 LBS | DIASTOLIC BLOOD PRESSURE: 75 MMHG | TEMPERATURE: 97.5 F | OXYGEN SATURATION: 99 % | BODY MASS INDEX: 21.68 KG/M2 | HEART RATE: 81 BPM

## 2023-12-26 DIAGNOSIS — N20.0 CALCULUS OF KIDNEY: ICD-10-CM

## 2023-12-26 DIAGNOSIS — R10.9 UNSPECIFIED ABDOMINAL PAIN: ICD-10-CM

## 2023-12-26 PROCEDURE — 99203 OFFICE O/P NEW LOW 30 MIN: CPT

## 2023-12-26 RX ORDER — CLOTRIMAZOLE 10 MG/G
1 CREAM TOPICAL
Qty: 15 | Refills: 0 | Status: DISCONTINUED | COMMUNITY
Start: 2022-03-14 | End: 2023-12-26

## 2023-12-26 RX ORDER — OXYCODONE HYDROCHLORIDE 15 MG/1
15 TABLET ORAL
Qty: 75 | Refills: 0 | Status: DISCONTINUED | COMMUNITY
Start: 2022-07-25 | End: 2023-12-26

## 2023-12-26 RX ORDER — DICLOFENAC SODIUM 1% 10 MG/G
1 GEL TOPICAL
Qty: 100 | Refills: 0 | Status: DISCONTINUED | COMMUNITY
Start: 2022-07-21 | End: 2023-12-26

## 2023-12-26 RX ORDER — HYDROXYZINE HYDROCHLORIDE 50 MG/1
50 TABLET ORAL
Qty: 30 | Refills: 0 | Status: DISCONTINUED | COMMUNITY
Start: 2022-07-13 | End: 2023-12-26

## 2023-12-26 NOTE — HISTORY OF PRESENT ILLNESS
[FreeTextEntry1] : Ms. SHAFFER is a 53 year-old White  F with history of back injury status post back surgery years ago.  Now being referred for back pain and 5mm left kidney stone identified in abdominal ultrasound (see scanned documents).  Back pain radiates to the front, no urinary symptoms.  No history of stones in the past. Denies fevers, chills, LUTS, hematuria, AUR.

## 2023-12-26 NOTE — PHYSICAL EXAM
[Normal Appearance] : normal appearance [Well Groomed] : well groomed [General Appearance - In No Acute Distress] : no acute distress [Edema] : no peripheral edema [Respiration, Rhythm And Depth] : normal respiratory rhythm and effort [Exaggerated Use Of Accessory Muscles For Inspiration] : no accessory muscle use [Abdomen Soft] : soft [Abdomen Tenderness] : non-tender [Costovertebral Angle Tenderness] : no ~M costovertebral angle tenderness [Urinary Bladder Findings] : the bladder was normal on palpation [Normal Station and Gait] : the gait and station were normal for the patient's age [] : no rash [Oriented To Time, Place, And Person] : oriented to person, place, and time [Affect] : the affect was normal [Mood] : the mood was normal

## 2023-12-26 NOTE — REVIEW OF SYSTEMS
[see HPI] : see HPI [Urine Infection (bladder/kidney)] : bladder/kidney infection [Negative] : Heme/Lymph [FreeTextEntry6] : Frequent Urination

## 2023-12-26 NOTE — SIGNATURES
[TextEntry] : Dave Basilio M.D. Minimally Invasive and Robotic Urologic Surgery [facility] | [facility] [employer] [facility] Tel: [phone] | Fax: [phone] | email: [email or web address]

## 2023-12-26 NOTE — ASSESSMENT
[FreeTextEntry1] : Ms. SHAFFER is a 53 year-old White  F with history of back injury status post back surgery years ago.  Now being referred for back pain that comes and goes for the last year and 5mm left kidney stone identified in abdominal ultrasound.  Discussed with patient the need to perform CT scan without contrast to confirm or rule out left kidney stone.  Patient understands that left kidney stone may or may not be contributing to her pain.  Also discussed with the patient the option of consulting pain management.

## 2024-01-16 ENCOUNTER — APPOINTMENT (OUTPATIENT)
Dept: CT IMAGING | Facility: HOSPITAL | Age: 54
End: 2024-01-16
Payer: COMMERCIAL

## 2024-01-16 ENCOUNTER — OUTPATIENT (OUTPATIENT)
Dept: OUTPATIENT SERVICES | Facility: HOSPITAL | Age: 54
LOS: 1 days | End: 2024-01-16
Payer: COMMERCIAL

## 2024-01-16 DIAGNOSIS — K40.90 UNILATERAL INGUINAL HERNIA, WITHOUT OBSTRUCTION OR GANGRENE, NOT SPECIFIED AS RECURRENT: Chronic | ICD-10-CM

## 2024-01-16 DIAGNOSIS — R10.9 UNSPECIFIED ABDOMINAL PAIN: ICD-10-CM

## 2024-01-16 PROCEDURE — 74176 CT ABD & PELVIS W/O CONTRAST: CPT

## 2024-01-16 PROCEDURE — 74176 CT ABD & PELVIS W/O CONTRAST: CPT | Mod: 26

## 2024-10-04 ENCOUNTER — INPATIENT (INPATIENT)
Facility: HOSPITAL | Age: 54
LOS: 2 days | Discharge: HOME | DRG: 880 | End: 2024-10-07
Attending: FAMILY MEDICINE | Admitting: FAMILY MEDICINE
Payer: COMMERCIAL

## 2024-10-04 VITALS
SYSTOLIC BLOOD PRESSURE: 143 MMHG | OXYGEN SATURATION: 100 % | TEMPERATURE: 98 F | HEART RATE: 118 BPM | RESPIRATION RATE: 20 BRPM | HEIGHT: 65 IN | DIASTOLIC BLOOD PRESSURE: 85 MMHG | WEIGHT: 132.06 LBS

## 2024-10-04 DIAGNOSIS — K40.90 UNILATERAL INGUINAL HERNIA, WITHOUT OBSTRUCTION OR GANGRENE, NOT SPECIFIED AS RECURRENT: Chronic | ICD-10-CM

## 2024-10-04 DIAGNOSIS — R56.9 UNSPECIFIED CONVULSIONS: ICD-10-CM

## 2024-10-04 LAB
ALBUMIN SERPL ELPH-MCNC: 4.3 G/DL — SIGNIFICANT CHANGE UP (ref 3.3–5)
ALP SERPL-CCNC: 82 U/L — SIGNIFICANT CHANGE UP (ref 40–120)
ALT FLD-CCNC: 24 U/L — SIGNIFICANT CHANGE UP (ref 10–45)
ANION GAP SERPL CALC-SCNC: 17 MMOL/L — SIGNIFICANT CHANGE UP (ref 5–17)
APTT BLD: 32.3 SEC — SIGNIFICANT CHANGE UP (ref 24.5–35.6)
AST SERPL-CCNC: 17 U/L — SIGNIFICANT CHANGE UP (ref 10–40)
BASOPHILS # BLD AUTO: 0.05 K/UL — SIGNIFICANT CHANGE UP (ref 0–0.2)
BASOPHILS NFR BLD AUTO: 0.6 % — SIGNIFICANT CHANGE UP (ref 0–2)
BILIRUB SERPL-MCNC: 0.6 MG/DL — SIGNIFICANT CHANGE UP (ref 0.2–1.2)
BUN SERPL-MCNC: 21 MG/DL — SIGNIFICANT CHANGE UP (ref 7–23)
CALCIUM SERPL-MCNC: 9.7 MG/DL — SIGNIFICANT CHANGE UP (ref 8.4–10.5)
CHLORIDE SERPL-SCNC: 99 MMOL/L — SIGNIFICANT CHANGE UP (ref 96–108)
CO2 SERPL-SCNC: 19 MMOL/L — LOW (ref 22–31)
CREAT SERPL-MCNC: 1.18 MG/DL — SIGNIFICANT CHANGE UP (ref 0.5–1.3)
EGFR: 55 ML/MIN/1.73M2 — LOW
EOSINOPHIL # BLD AUTO: 0.03 K/UL — SIGNIFICANT CHANGE UP (ref 0–0.5)
EOSINOPHIL NFR BLD AUTO: 0.3 % — SIGNIFICANT CHANGE UP (ref 0–6)
ETHANOL SERPL-MCNC: <3 MG/DL — SIGNIFICANT CHANGE UP (ref 0–3)
GLUCOSE SERPL-MCNC: 174 MG/DL — HIGH (ref 70–99)
HCG SERPL-ACNC: 2 MIU/ML — SIGNIFICANT CHANGE UP
HCT VFR BLD CALC: 39 % — SIGNIFICANT CHANGE UP (ref 34.5–45)
HGB BLD-MCNC: 13.6 G/DL — SIGNIFICANT CHANGE UP (ref 11.5–15.5)
IMM GRANULOCYTES NFR BLD AUTO: 0.3 % — SIGNIFICANT CHANGE UP (ref 0–0.9)
INR BLD: 0.99 RATIO — SIGNIFICANT CHANGE UP (ref 0.85–1.16)
LACTATE SERPL-SCNC: 0.9 MMOL/L — SIGNIFICANT CHANGE UP (ref 0.7–2)
LACTATE SERPL-SCNC: 3.7 MMOL/L — HIGH (ref 0.7–2)
LYMPHOCYTES # BLD AUTO: 3.19 K/UL — SIGNIFICANT CHANGE UP (ref 1–3.3)
LYMPHOCYTES # BLD AUTO: 36.7 % — SIGNIFICANT CHANGE UP (ref 13–44)
MAGNESIUM SERPL-MCNC: 1.7 MG/DL — SIGNIFICANT CHANGE UP (ref 1.6–2.6)
MCHC RBC-ENTMCNC: 30.4 PG — SIGNIFICANT CHANGE UP (ref 27–34)
MCHC RBC-ENTMCNC: 34.9 GM/DL — SIGNIFICANT CHANGE UP (ref 32–36)
MCV RBC AUTO: 87.1 FL — SIGNIFICANT CHANGE UP (ref 80–100)
MONOCYTES # BLD AUTO: 0.77 K/UL — SIGNIFICANT CHANGE UP (ref 0–0.9)
MONOCYTES NFR BLD AUTO: 8.9 % — SIGNIFICANT CHANGE UP (ref 2–14)
NEUTROPHILS # BLD AUTO: 4.62 K/UL — SIGNIFICANT CHANGE UP (ref 1.8–7.4)
NEUTROPHILS NFR BLD AUTO: 53.2 % — SIGNIFICANT CHANGE UP (ref 43–77)
NRBC # BLD: 0 /100 WBCS — SIGNIFICANT CHANGE UP (ref 0–0)
PLATELET # BLD AUTO: 362 K/UL — SIGNIFICANT CHANGE UP (ref 150–400)
POTASSIUM SERPL-MCNC: 3.4 MMOL/L — LOW (ref 3.5–5.3)
POTASSIUM SERPL-SCNC: 3.4 MMOL/L — LOW (ref 3.5–5.3)
PROT SERPL-MCNC: 7.8 G/DL — SIGNIFICANT CHANGE UP (ref 6–8.3)
PROTHROM AB SERPL-ACNC: 11.7 SEC — SIGNIFICANT CHANGE UP (ref 9.9–13.4)
RBC # BLD: 4.48 M/UL — SIGNIFICANT CHANGE UP (ref 3.8–5.2)
RBC # FLD: 12.1 % — SIGNIFICANT CHANGE UP (ref 10.3–14.5)
SODIUM SERPL-SCNC: 135 MMOL/L — SIGNIFICANT CHANGE UP (ref 135–145)
TROPONIN I, HIGH SENSITIVITY RESULT: 6.7 NG/L — SIGNIFICANT CHANGE UP
TROPONIN I, HIGH SENSITIVITY RESULT: <4 NG/L — SIGNIFICANT CHANGE UP
WBC # BLD: 8.69 K/UL — SIGNIFICANT CHANGE UP (ref 3.8–10.5)
WBC # FLD AUTO: 8.69 K/UL — SIGNIFICANT CHANGE UP (ref 3.8–10.5)

## 2024-10-04 PROCEDURE — 71045 X-RAY EXAM CHEST 1 VIEW: CPT | Mod: 26

## 2024-10-04 PROCEDURE — 70496 CT ANGIOGRAPHY HEAD: CPT | Mod: 26,MC

## 2024-10-04 PROCEDURE — 93010 ELECTROCARDIOGRAM REPORT: CPT

## 2024-10-04 PROCEDURE — 99223 1ST HOSP IP/OBS HIGH 75: CPT

## 2024-10-04 PROCEDURE — 70498 CT ANGIOGRAPHY NECK: CPT | Mod: 26,MC

## 2024-10-04 PROCEDURE — 99285 EMERGENCY DEPT VISIT HI MDM: CPT

## 2024-10-04 RX ORDER — ALPRAZOLAM 0.5 MG/1
0.25 TABLET ORAL AT BEDTIME
Refills: 0 | Status: DISCONTINUED | OUTPATIENT
Start: 2024-10-04 | End: 2024-10-04

## 2024-10-04 RX ORDER — SODIUM CHLORIDE 0.9 % (FLUSH) 0.9 %
1000 SYRINGE (ML) INJECTION ONCE
Refills: 0 | Status: COMPLETED | OUTPATIENT
Start: 2024-10-04 | End: 2024-10-04

## 2024-10-04 RX ORDER — SODIUM CHLORIDE 0.9 % (FLUSH) 0.9 %
1000 SYRINGE (ML) INJECTION
Refills: 0 | Status: DISCONTINUED | OUTPATIENT
Start: 2024-10-04 | End: 2024-10-07

## 2024-10-04 RX ORDER — ACETAMINOPHEN 325 MG
650 TABLET ORAL EVERY 6 HOURS
Refills: 0 | Status: DISCONTINUED | OUTPATIENT
Start: 2024-10-04 | End: 2024-10-07

## 2024-10-04 RX ORDER — ALPRAZOLAM 0.5 MG/1
0.25 TABLET ORAL AT BEDTIME
Refills: 0 | Status: DISCONTINUED | OUTPATIENT
Start: 2024-10-04 | End: 2024-10-06

## 2024-10-04 RX ORDER — MAG HYDROX/ALUMINUM HYD/SIMETH 200-200-20
30 SUSPENSION, ORAL (FINAL DOSE FORM) ORAL EVERY 4 HOURS
Refills: 0 | Status: DISCONTINUED | OUTPATIENT
Start: 2024-10-04 | End: 2024-10-07

## 2024-10-04 RX ORDER — 5-HYDROXYTRYPTOPHAN (5-HTP) 100 MG
3 TABLET,DISINTEGRATING ORAL AT BEDTIME
Refills: 0 | Status: DISCONTINUED | OUTPATIENT
Start: 2024-10-04 | End: 2024-10-07

## 2024-10-04 RX ORDER — ONDANSETRON HCL/PF 4 MG/2 ML
4 VIAL (ML) INJECTION EVERY 8 HOURS
Refills: 0 | Status: DISCONTINUED | OUTPATIENT
Start: 2024-10-04 | End: 2024-10-07

## 2024-10-04 RX ORDER — PANTOPRAZOLE SODIUM 40 MG/1
40 TABLET, DELAYED RELEASE ORAL
Refills: 0 | Status: DISCONTINUED | OUTPATIENT
Start: 2024-10-04 | End: 2024-10-07

## 2024-10-04 RX ADMIN — Medication 1000 MILLILITER(S): at 15:54

## 2024-10-04 RX ADMIN — ALPRAZOLAM 0.25 MILLIGRAM(S): 0.5 TABLET ORAL at 21:36

## 2024-10-04 RX ADMIN — Medication 40 MILLIEQUIVALENT(S): at 19:04

## 2024-10-04 RX ADMIN — Medication 100 MILLILITER(S): at 21:20

## 2024-10-04 NOTE — H&P ADULT - NSHPLABSRESULTS_GEN_ALL_CORE
13.6   8.69  )-----------( 362      ( 04 Oct 2024 15:40 )             39.0       10-04    135  |  99  |  21  ----------------------------<  174[H]  3.4[L]   |  19[L]  |  1.18    Ca    9.7      04 Oct 2024 15:40  Mg     1.7     10-04    TPro  7.8  /  Alb  4.3  /  TBili  0.6  /  DBili  x   /  AST  17  /  ALT  24  /  AlkPhos  82  10-04         PT/INR - ( 04 Oct 2024 15:40 )   PT: 11.7 sec;   INR: 0.99 ratio         PTT - ( 04 Oct 2024 15:40 )  PTT:32.3 sec    Lactate Trend  10-04 @ 15:40 Lactate:3.7     POCT Blood Glucose.: 158 mg/dL (04 Oct 2024 15:24)    Troponin I, High Sensitivity Result: <4.0: Serial measurements of high sensitivity troponin I (hs TnI) showing rapid  upward or downward changes suggest acute myocardial injury.  Please note  that a sustained elevation of hsTnI can be caused by renal disease,  chronic heart failure, sepsis, pulmonary embolism and other clinical  conditions. ng/L (10.04.24 @ 15:40)    Labs reviewed:     CXR personally reviewed:     ECG reviewed and interpreted: sinus tachy 13.6   8.69  )-----------( 362      ( 04 Oct 2024 15:40 )             39.0       10-04    135  |  99  |  21  ----------------------------<  174[H]  3.4[L]   |  19[L]  |  1.18    Ca    9.7      04 Oct 2024 15:40  Mg     1.7     10-04    TPro  7.8  /  Alb  4.3  /  TBili  0.6  /  DBili  x   /  AST  17  /  ALT  24  /  AlkPhos  82  10-04         PT/INR - ( 04 Oct 2024 15:40 )   PT: 11.7 sec;   INR: 0.99 ratio         PTT - ( 04 Oct 2024 15:40 )  PTT:32.3 sec    Lactate Trend  10-04 @ 15:40 Lactate:3.7     POCT Blood Glucose.: 158 mg/dL (04 Oct 2024 15:24)    Troponin I, High Sensitivity Result: <4.0: Serial measurements of high sensitivity troponin I (hs TnI) showing rapid  upward or downward changes suggest acute myocardial injury.  Please note  that a sustained elevation of hsTnI can be caused by renal disease,  chronic heart failure, sepsis, pulmonary embolism and other clinical  conditions. ng/L (10.04.24 @ 15:40)    Labs reviewed:     CXR personally reviewed: napd    ECG reviewed and interpreted: sinus tachy    < from: CT Angio Neck w/ IV Cont (10.04.24 @ 17:06) >    IMPRESSION: Unremarkable noncontrast head CT, CTA of the neck and [place]   of [place].    < end of copied text >

## 2024-10-04 NOTE — ED ADULT NURSE NOTE - NSFALLUNIVINTERV_ED_ALL_ED
Bed/Stretcher in lowest position, wheels locked, appropriate side rails in place/Call bell, personal items and telephone in reach/Instruct patient to call for assistance before getting out of bed/chair/stretcher/Non-slip footwear applied when patient is off stretcher/[place] to call system/Physically safe environment - no spills, clutter or unnecessary equipment/Purposeful proactive rounding/Room/bathroom lighting operational, light cord in reach

## 2024-10-04 NOTE — H&P ADULT - NSHPPHYSICALEXAM_GEN_ALL_CORE
Vital Signs Last 24 Hrs  T(C): 36.7 (04 Oct 2024 15:26), Max: 36.7 (04 Oct 2024 15:26)  T(F): 98 (04 Oct 2024 15:26), Max: 98 (04 Oct 2024 15:26)  HR: 118 (04 Oct 2024 15:26) (118 - 118)  BP: 143/85 (04 Oct 2024 15:26) (143/85 - 143/85)  BP(mean): --  RR: 20 (04 Oct 2024 15:26) (20 - 20)  SpO2: 100% (04 Oct 2024 15:26) (100% - 100%)    Parameters below as of 04 Oct 2024 15:26  Patient On (Oxygen Delivery Method): room air Vital Signs Last 24 Hrs  T(C): 36.7 (04 Oct 2024 15:26), Max: 36.7 (04 Oct 2024 15:26)  T(F): 98 (04 Oct 2024 15:26), Max: 98 (04 Oct 2024 15:26)  HR: 118 (04 Oct 2024 15:26) (118 - 118)  BP: 143/85 (04 Oct 2024 15:26) (143/85 - 143/85)  BP(mean): --  RR: 20 (04 Oct 2024 15:26) (20 - 20)  SpO2: 100% (04 Oct 2024 15:26) (100% - 100%)    Parameters below as of 04 Oct 2024 15:26  Patient On (Oxygen Delivery Method): room air    GENERAL- NAD  EAR/NOSE/MOUTH/THROAT -  MMM  EYES- RACHANA, conjunctiva and Sclera clear  NECK- supple  RESPIRATORY-  clear to auscultation bilaterally  CARDIOVASCULAR - SIS2, RRR  GI - soft NT BS present  EXTREMITIES- no pedal edema   NEUROLOGY- no gross focal deficits  PSYCHIATRY- AAO X 3

## 2024-10-04 NOTE — ED PROVIDER NOTE - CLINICAL SUMMARY MEDICAL DECISION MAKING FREE TEXT BOX
Dr. Milind Cárdenas MD, EM And Medical Toxicology Attendin-year-old female with a past medical history of insomnia for the past 9 years presenting with possible seizure today.  She describes while in the shower experiencing prodromal lightheadedness and generalized fatigue/weakness. She then called her sister whom arrived at the patient's house to find her anxiously pacing. Gradually, she began to feel subjective generalized weakness in all her extremities. Enroute to the ED, EMS reported that the patient was having tonic clonic movement for few seconds that spontaneously resolved on arrival to the ED.  she is reporting mild chest pain, nonpositional, nonpleuritic, nonexertional, that is not radiating, intermittent.  This is associated with generalized fatigue and that she needs to "fall asleep".  Of note she has been awake for the past 2 days due to her chronic insomnia which she takes no medications. Denies any abdominal pain, shortness of breath, nausea/vomiting, headaches, fevers, chills,   LOC/syncope, hematuria, dysuria, urinary symptoms, subjective neurological deficits, prior seizure hx.  History obtained from independent historian: N/A  External note reviewed: N/A  DDx includes but is not limited to: seizures, syncope, insomnia?, generalized fatigue/anxiety.   Decision making, ED course, independent interpretation of imaging studies, and consults:   -  CBC, CMP, Trop, EKG, CTA of the head and neck.  CVA felt unlikely as the patient appears to have subjective generalized weakness due to effort. she has no facial droop.  possible postictal state from recent seizure reported by ems? will obtain lactate.  -  6:12 PM CT a of the head and neck are negative for any pathology, troponin x 1 is negative, EKG is sinus tach nonischemic.  On reevaluation the patient is sitting up in bed with 5/5 motor strength throughout speaking normally and feeling her normal baseline, eating and drinking normally.   - we will admit to rule out seizure versus syncope.  Case discussed with Dr. Addison hospitalist on-call who accepts.    Consideration hospitalization vs de-escalation of care: admit  Disposition: admit

## 2024-10-04 NOTE — H&P ADULT - ASSESSMENT
weakness, presyncopal episode, r/o Seizure episode  lactic acidosis  admit to tele  cardiac monitoring  trend trops  TTE in am   EKG  in am  am labs  cardio and neuro consult  neuro checks q4.  EEG in am  asp precautions  IV fluids- received 1 LNS in ED, will continue ivf    hypokalemia-  replete  monitor    gerd-   ppi    vte ppx- PAS         53 y o f  with  PMH of insomnia,  BIBA from home with possible seizure, pt reports waking up normal today around noon felt heaviness in her chest, retrosternal, no radiation, then started feeling tingling of her hands arms and feet anf then legs, felt weak and could not move her body, she called her sister who called EMS. episode assocated with dizziness. EMS noted some clonic movements of the body. PT  does not remember clearly  what happened in the ambulance,   episode resolved after arrival to ED.    on admission she is AA0 X 3, no neuro deficits.     reports she is unable to sleep at night and has insomnia x 9 yrs. she has  been seen by psyh  but no improvement    denies cough, sob, dysuria, n/v, belly pain.    weakness, presyncopal episode, r/o Seizure episode  lactic acidosis- resolved with IVF  admit to tele  cardiac monitoring  cta head, neck- no acute changes   trend trops  TTE in am   EKG  in am  am labs  cardio and neuro consult  dr. toledo notified  neuro checks q4.  EEG in am  asp precautions  IV fluids- received 1 LNS in ED, will continue ivf    hypokalemia-  replete  monitor    insomnia-   says melatonin does not help at all  will try small dose of xanax- .25 qhs prn    gerd-   ppi    vte ppx- PAS

## 2024-10-04 NOTE — PATIENT PROFILE ADULT - FALL HARM RISK - HARM RISK INTERVENTIONS

## 2024-10-04 NOTE — ED PROVIDER NOTE - OBJECTIVE STATEMENT
53-year-old female with a past medical history of insomnia for the past 9 years presenting with possible seizure today.  She describes while in the shower experiencing prodromal lightheadedness and generalized fatigue/weakness. She then called her sister whom arrived at the patient's house to find her anxiously pacing. Gradually, she began to feel subjective generalized weakness in all her extremities. Enroute to the ED, EMS reported that the patient was having tonic clonic movement for few seconds that spontaneously resolved on arrival to the ED.  she is reporting mild chest pain, nonpositional, nonpleuritic, nonexertional, that is not radiating, intermittent.  This is associated with generalized fatigue and that she needs to "fall asleep".  Of note she has been awake for the past 2 days due to her chronic insomnia which she takes no medications. Denies any abdominal pain, shortness of breath, nausea/vomiting, headaches, fevers, chills,   LOC/syncope, hematuria, dysuria, urinary symptoms, subjective neurological deficits, prior seizure hx.

## 2024-10-04 NOTE — ED ADULT NURSE NOTE - NSFALLOOBATTEMPT_ED_ALL_ED
History of bilateral tubal ligation    History of breast augmentation    History of parathyroidectomy No

## 2024-10-04 NOTE — H&P ADULT - HISTORY OF PRESENT ILLNESS
53 y o f  with no PMH BIBA from home with possible seizure. PT has total body weakness with fluttering of the eyes    pt  53 y o f  with  PMH of insomnia,  BIBA from home with possible seizure, pt reports waking up normal today around noon felt heaviness in her chest, retrosternal, no radiation, then started feeling tingling of her hands arms and feet anf then legs, felt weak and could not move her body, she called her sister who called EMS. episode assocated with dizziness. EMS noted some clonic movements of the body. PT  does not remember clearly  what happened in the ambulance,   episode resolved after arrival to ED.    on admission she is AA0 X 3, no neuro deficits.     reports she is unable to sleep at night and has insomnia x 9 yrs. she has  been seen by psyh  but no improvement    denies cough, sob, dysuria, n/v, belly pain.     ed course- ct head negative, ekg sinus tachy, lactate 3.7  received NS bolus 1L

## 2024-10-04 NOTE — ED ADULT TRIAGE NOTE - BMI (KG/M2)
Health Maintenance Due   Topic Date Due   â¢ COVID-19 Vaccine (1) Never done       Patient is up to date, no discussion needed. 22

## 2024-10-04 NOTE — ED PROVIDER NOTE - PHYSICAL EXAMINATION
VITAL SIGNS: I have reviewed nursing notes and confirm.   GEN: Well-developed; well-nourished; in mild anxious acute distress.  Saying that " I cannot move her arms or legs and feels weak"  SKIN: Warm, pink, no rash, no diaphoresis, no cyanosis, well perfused.   HEAD: Normocephalic; atraumatic.    NECK: Supple; non tender.   EYES: Pupils 3mm equal, round, reactive to light and accomodation, conjunctiva and sclera clear. Extra-ocular movements intact bilaterally.  ENT: No nasal discharge; airway clear. Trachea is midline.    CV: RRR. S1, S2 normal; no murmurs, gallops, or rubs. Capillary refill < 2 seconds throughout. Distal pulses intact 2+ throughout.  RESP: CTA bilaterally. No wheezes, rales, or rhonchi.   ABD: Normal bowel sounds, soft, non-distended, non-tender, no rebound, no guarding, no rigidity, no hepatosplenomegaly.  MSK: Normal range of motion and movement of all 4 extremities. No apparent joint or muscular pain throughout.    BACK: No thoracolumbar midline or paravertebral tenderness.    NEURO: Alert & oriented x 3. NO facial droop, supple extremities, reflexes 0+, sensation to soft touch intact throughout. 0/5 mtr strength throughout however likely effort related.

## 2024-10-04 NOTE — ED ADULT NURSE NOTE - OBJECTIVE STATEMENT
Pt BIBA from home with possible seizure. pt was in the shower when she started feeling fatigue, sister called EMS. hx of insomnia x9yrs. denies any cp, sob,

## 2024-10-05 ENCOUNTER — TRANSCRIPTION ENCOUNTER (OUTPATIENT)
Age: 54
End: 2024-10-05

## 2024-10-05 ENCOUNTER — RESULT REVIEW (OUTPATIENT)
Age: 54
End: 2024-10-05

## 2024-10-05 LAB
ALBUMIN SERPL ELPH-MCNC: 3.4 G/DL — SIGNIFICANT CHANGE UP (ref 3.3–5)
ALP SERPL-CCNC: 70 U/L — SIGNIFICANT CHANGE UP (ref 40–120)
ALT FLD-CCNC: 20 U/L — SIGNIFICANT CHANGE UP (ref 10–45)
ANION GAP SERPL CALC-SCNC: 11 MMOL/L — SIGNIFICANT CHANGE UP (ref 5–17)
AST SERPL-CCNC: 15 U/L — SIGNIFICANT CHANGE UP (ref 10–40)
BASOPHILS # BLD AUTO: 0.05 K/UL — SIGNIFICANT CHANGE UP (ref 0–0.2)
BASOPHILS NFR BLD AUTO: 0.6 % — SIGNIFICANT CHANGE UP (ref 0–2)
BILIRUB SERPL-MCNC: 0.7 MG/DL — SIGNIFICANT CHANGE UP (ref 0.2–1.2)
BUN SERPL-MCNC: 9 MG/DL — SIGNIFICANT CHANGE UP (ref 7–23)
CALCIUM SERPL-MCNC: 8.8 MG/DL — SIGNIFICANT CHANGE UP (ref 8.4–10.5)
CHLORIDE SERPL-SCNC: 107 MMOL/L — SIGNIFICANT CHANGE UP (ref 96–108)
CO2 SERPL-SCNC: 22 MMOL/L — SIGNIFICANT CHANGE UP (ref 22–31)
CREAT SERPL-MCNC: 0.84 MG/DL — SIGNIFICANT CHANGE UP (ref 0.5–1.3)
EGFR: 82 ML/MIN/1.73M2 — SIGNIFICANT CHANGE UP
EOSINOPHIL # BLD AUTO: 0.07 K/UL — SIGNIFICANT CHANGE UP (ref 0–0.5)
EOSINOPHIL NFR BLD AUTO: 0.9 % — SIGNIFICANT CHANGE UP (ref 0–6)
GLUCOSE SERPL-MCNC: 81 MG/DL — SIGNIFICANT CHANGE UP (ref 70–99)
HCT VFR BLD CALC: 36.1 % — SIGNIFICANT CHANGE UP (ref 34.5–45)
HGB BLD-MCNC: 12.2 G/DL — SIGNIFICANT CHANGE UP (ref 11.5–15.5)
IMM GRANULOCYTES NFR BLD AUTO: 0.1 % — SIGNIFICANT CHANGE UP (ref 0–0.9)
LYMPHOCYTES # BLD AUTO: 3.07 K/UL — SIGNIFICANT CHANGE UP (ref 1–3.3)
LYMPHOCYTES # BLD AUTO: 38.7 % — SIGNIFICANT CHANGE UP (ref 13–44)
MCHC RBC-ENTMCNC: 30.8 PG — SIGNIFICANT CHANGE UP (ref 27–34)
MCHC RBC-ENTMCNC: 33.8 GM/DL — SIGNIFICANT CHANGE UP (ref 32–36)
MCV RBC AUTO: 91.2 FL — SIGNIFICANT CHANGE UP (ref 80–100)
MONOCYTES # BLD AUTO: 0.7 K/UL — SIGNIFICANT CHANGE UP (ref 0–0.9)
MONOCYTES NFR BLD AUTO: 8.8 % — SIGNIFICANT CHANGE UP (ref 2–14)
NEUTROPHILS # BLD AUTO: 4.03 K/UL — SIGNIFICANT CHANGE UP (ref 1.8–7.4)
NEUTROPHILS NFR BLD AUTO: 50.9 % — SIGNIFICANT CHANGE UP (ref 43–77)
NRBC # BLD: 0 /100 WBCS — SIGNIFICANT CHANGE UP (ref 0–0)
PLATELET # BLD AUTO: 297 K/UL — SIGNIFICANT CHANGE UP (ref 150–400)
POTASSIUM SERPL-MCNC: 3.8 MMOL/L — SIGNIFICANT CHANGE UP (ref 3.5–5.3)
POTASSIUM SERPL-SCNC: 3.8 MMOL/L — SIGNIFICANT CHANGE UP (ref 3.5–5.3)
PROT SERPL-MCNC: 6.5 G/DL — SIGNIFICANT CHANGE UP (ref 6–8.3)
RBC # BLD: 3.96 M/UL — SIGNIFICANT CHANGE UP (ref 3.8–5.2)
RBC # FLD: 12.5 % — SIGNIFICANT CHANGE UP (ref 10.3–14.5)
SODIUM SERPL-SCNC: 140 MMOL/L — SIGNIFICANT CHANGE UP (ref 135–145)
TROPONIN I, HIGH SENSITIVITY RESULT: 5.9 NG/L — SIGNIFICANT CHANGE UP
WBC # BLD: 7.93 K/UL — SIGNIFICANT CHANGE UP (ref 3.8–10.5)
WBC # FLD AUTO: 7.93 K/UL — SIGNIFICANT CHANGE UP (ref 3.8–10.5)

## 2024-10-05 PROCEDURE — 99233 SBSQ HOSP IP/OBS HIGH 50: CPT

## 2024-10-05 PROCEDURE — 93306 TTE W/DOPPLER COMPLETE: CPT | Mod: 26

## 2024-10-05 PROCEDURE — 99222 1ST HOSP IP/OBS MODERATE 55: CPT

## 2024-10-05 PROCEDURE — 93010 ELECTROCARDIOGRAM REPORT: CPT

## 2024-10-05 RX ORDER — 5-HYDROXYTRYPTOPHAN (5-HTP) 100 MG
3 TABLET,DISINTEGRATING ORAL ONCE
Refills: 0 | Status: COMPLETED | OUTPATIENT
Start: 2024-10-05 | End: 2024-10-05

## 2024-10-05 RX ADMIN — Medication 100 MILLILITER(S): at 23:59

## 2024-10-05 RX ADMIN — ALPRAZOLAM 0.25 MILLIGRAM(S): 0.5 TABLET ORAL at 21:33

## 2024-10-05 RX ADMIN — Medication 30 MILLILITER(S): at 18:34

## 2024-10-05 RX ADMIN — Medication 3 MILLIGRAM(S): at 23:47

## 2024-10-05 RX ADMIN — Medication 3 MILLIGRAM(S): at 01:29

## 2024-10-05 RX ADMIN — PANTOPRAZOLE SODIUM 40 MILLIGRAM(S): 40 TABLET, DELAYED RELEASE ORAL at 06:16

## 2024-10-05 NOTE — DISCHARGE NOTE PROVIDER - NSDCFUADDAPPT_GEN_ALL_CORE_FT
APPTS ARE READY TO BE MADE: [x] YES    Best Family or Patient Contact (if needed):    Additional Information about above appointments (if needed):    1: Cardiologist  2: Psychiatrist  3:     Other comments or requests:    APPTS ARE READY TO BE MADE: [x] YES    Best Family or Patient Contact (if needed):    Additional Information about above appointments (if needed):    1: Cardiologist  2: Psychiatrist  3:     Other comments or requests:   Provided patient with provider referral information, however patient prefers to schedule the appointments on their own.

## 2024-10-05 NOTE — DISCHARGE NOTE PROVIDER - ATTENDING DISCHARGE PHYSICAL EXAMINATION:
PHYSICAL EXAM:   General: Awake and alert, cooperative with exam. No acute distress.   Cardiology: Normal S1, S2. No murmurs. Regular rate and rhythm.   Respiratory: Lungs clear to ascultation bilaterally. No wheezes, rales, or rhonchi.   Gastrointestinal: Positive bowel sounds. Soft. Non-tender. Non-distended. No guarding, rigidity, or rebound tenderness.  Extremities: No peripheral edema bilaterally.  Neurological: A+Ox3. CN 2-12 intact. No focal neurological deficits. Normal speech. No facial droop.

## 2024-10-05 NOTE — DISCHARGE NOTE PROVIDER - CARE PROVIDER_API CALL
Fahad Miranda.  Internal Medicine  [address] [place] [address]  Phone: [phone]  Fax: [phone]  Follow Up Time: 1 week

## 2024-10-05 NOTE — PHYSICAL THERAPY INITIAL EVALUATION ADULT - PERTINENT HX OF CURRENT PROBLEM, REHAB EVAL
53 y o f  with  PMH of insomnia,  BIBA from home with possible seizure, pt reports waking up normal today around noon felt heaviness in her chest, retrosternal, no radiation, then started feeling tingling of her hands arms and feet anf then legs, felt weak and could not move her body, she called her sister who called EMS. episode assocated with dizziness. EMS noted some clonic movements of the body. PT  does not remember clearly  what happened in the ambulance,   episode resolved after arrival to ED.

## 2024-10-05 NOTE — PROGRESS NOTE ADULT - SUBJECTIVE AND OBJECTIVE BOX
INTERVAL HPI/ OVERNIGHTS  EVENTS: NONE  Patient was seen and examined at bedside. Pt stated she was not able to sleep, improved of her symptoms but remains weak, denies acute events.      PHYSICAL EXAM:  Vital Signs Last 24 Hrs  T(C): 36.9 (05 Oct 2024 12:31), Max: 36.9 (05 Oct 2024 12:31)  T(F): 98.4 (05 Oct 2024 12:31), Max: 98.4 (05 Oct 2024 12:31)  HR: 90 (05 Oct 2024 12:31) (60 - 118)  BP: 116/82 (05 Oct 2024 12:31) (110/80 - 173/83)  BP(mean): 98 (05 Oct 2024 12:31) (93 - 98)  RR: 18 (05 Oct 2024 12:31) (18 - 20)  SpO2: 98% (05 Oct 2024 12:31) (97% - 100%)    Parameters below as of 05 Oct 2024 12:31  Patient On (Oxygen Delivery Method): room air        Constitutional: Pt lying in bed, awake and alert, NAD  HEENT: EOMI, normal hearing, moist mucous membranes  Neck: Soft and supple, no JVD  Respiratory: CTABL, No wheezing, rales or rhonchi  Cardiovascular: S1S2+, RRR, no M/G/R  Gastrointestinal: BS+, soft, NT/ND, no guarding, no rebound  Extremities: No peripheral edema  Vascular: 2+ peripheral pulses  Neurological: AAOx3, no focal deficits  Musculoskeletal: 5/5 strength b/l upper and lower extremities  Skin: No rashes    MEDICATIONS:  MEDICATIONS  (STANDING):  pantoprazole    Tablet 40 milliGRAM(s) Oral before breakfast  sodium chloride 0.9%. 1000 milliLiter(s) (100 mL/Hr) IV Continuous <Continuous>      LABS: All Labs Reviewed:                        12.2   7.93  )-----------( 297      ( 05 Oct 2024 06:00 )             36.1     10-05    140  |  107  |  9   ----------------------------<  81  3.8   |  22  |  0.84    Ca    8.8      05 Oct 2024 06:00  Mg     1.7     10-04    TPro  6.5  /  Alb  3.4  /  TBili  0.7  /  DBili  x   /  AST  15  /  ALT  20  /  AlkPhos  70  10-05    PT/INR - ( 04 Oct 2024 15:40 )   PT: 11.7 sec;   INR: 0.99 ratio         PTT - ( 04 Oct 2024 15:40 )  PTT:32.3 sec      Blood Culture:   I&O's Summary    04 Oct 2024 07:01  -  05 Oct 2024 07:00  --------------------------------------------------------  IN: 1200 mL / OUT: 0 mL / NET: 1200 mL      CAPILLARY BLOOD GLUCOSE      POCT Blood Glucose.: 158 mg/dL (04 Oct 2024 15:24)      RADIOLOGY/EKG:

## 2024-10-05 NOTE — DISCHARGE NOTE PROVIDER - HOSPITAL COURSE
Feli Alberts is 54 y/o female  with  PMH of insomnia x 9 years with no improvements,  presented to  ED from home with possible seizure, pt reports waking up normal today around noon felt heaviness in her chest, retrosternal, no radiation, then started feeling tingling of her hands arms and feet and then legs, felt weak and could not move her body, she called her sister who called EMS. Patient stated a episode assocated with dizziness. EMS noted some clonic movements of the body. PT does not remember clearly  what happened in the ambulance, In the ED, episode resolved noted, AA0 X 3, no neuro deficits. Denied cough, sob, dysuria, n/v, belly pain. Imagining showed CT head negative,  sinus tachy, Lactate 3.7. Patient was given NS bolus 1L. Patient was admitted to the floor for further management. Cardiologist consulted and recommended. Neurologist was consulted and recommended ____    ---  HOSPITAL COURSE:       ---  VITALS:   Vital Signs Last 24 Hrs  T(F): 98.4 (05 Oct 2024 12:31), Max: 98.4 (05 Oct 2024 12:31)  HR: 90 (05 Oct 2024 12:31) (60 - 94)  BP: 116/82 (05 Oct 2024 12:31) (116/82 - 173/83)  RR: 18 (05 Oct 2024 12:31) (18 - 19)  SpO2: 98% (05 Oct 2024 12:31) (97% - 98%)  ---  PHYSICAL EXAM:   General: Awake and alert, cooperative with exam. No acute distress.   Cardiology: Normal S1, S2. No murmurs. Regular rate and rhythm.   Respiratory: Lungs clear to ascultation bilaterally. No wheezes, rales, or rhonchi.   Gastrointestinal: Positive bowel sounds. Soft. Non-tender. Non-distended. No guarding, rigidity, or rebound tenderness.  Extremities: No peripheral edema bilaterally.  Neurological: A+Ox3. CN 2-12 intact. No focal neurological deficits. Normal speech. No facial droop.   ---  Primary care provider was made aware of plan for discharge:      [  ] NO     [  ] YES   Feli Alberts is 54 y/o female  with  PMH of insomnia x 9 years with no improvements,  presented to  ED from home with possible seizure, pt reports waking up normal today around noon felt heaviness in her chest, retrosternal, no radiation, then started feeling tingling of her hands arms and feet and then legs, felt weak and could not move her body, she called her sister who called EMS. Patient stated a episode assocated with dizziness. EMS noted some clonic movements of the body. PT does not remember clearly  what happened in the ambulance, In the ED, episode resolved noted, AA0 X 3, no neuro deficits. Denied cough, sob, dysuria, n/v, belly pain. Imagining showed CT head negative,  sinus tachy, Lactate 3.7. Patient was given NS bolus 1L. Patient was admitted to the floor for further management. Cardiologist consulted and recommended outpatient management for Stress Test. Neurologist was consulted and recommended ____. EEG showed:________    ---  HOSPITAL COURSE:       ---  VITALS:   Vital Signs Last 24 Hrs  T(F): 98.4 (05 Oct 2024 12:31), Max: 98.4 (05 Oct 2024 12:31)  HR: 90 (05 Oct 2024 12:31) (60 - 94)  BP: 116/82 (05 Oct 2024 12:31) (116/82 - 173/83)  RR: 18 (05 Oct 2024 12:31) (18 - 19)  SpO2: 98% (05 Oct 2024 12:31) (97% - 98%)  ---  PHYSICAL EXAM:   General: Awake and alert, cooperative with exam. No acute distress.   Cardiology: Normal S1, S2. No murmurs. Regular rate and rhythm.   Respiratory: Lungs clear to ascultation bilaterally. No wheezes, rales, or rhonchi.   Gastrointestinal: Positive bowel sounds. Soft. Non-tender. Non-distended. No guarding, rigidity, or rebound tenderness.  Extremities: No peripheral edema bilaterally.  Neurological: A+Ox3. CN 2-12 intact. No focal neurological deficits. Normal speech. No facial droop.   ---  Primary care provider was made aware of plan for discharge:      [  ] NO     [  ] YES   Feli Alberts is 54 y/o female  with  PMH of insomnia x 9 years with no improvements,  presented to  ED from home with possible seizure, pt reports waking up normal today around noon felt heaviness in her chest, retrosternal, no radiation, then started feeling tingling of her hands arms and feet and then legs, felt weak and could not move her body, she called her sister who called EMS. Patient stated a episode assocated with dizziness. EMS noted some clonic movements of the body. PT does not remember clearly  what happened in the ambulance, In the ED, episode resolved noted, AA0 X 3, no neuro deficits. Denied cough, sob, dysuria, n/v, belly pain. Imagining showed CT head negative,  sinus tachy, Lactate 3.7. Patient was given NS bolus 1L. Patient was admitted to the floor for further management. Cardiologist consulted and recommended outpatient management for Stress Test. Neurologist was consulted and recommended EEG which showed:__ Unlikely a seizure.. Psychiatrist was consulted and recommended___  Patient was seen and examined at bedside. He is stable and medically optimized for discharge. Patient will continue with__ . We recommend following up with the Psychiatrist for management of Anxiety and Insomnia. We recommend to following with Cardiologist for evaluation and possible Stress Test.     ---  VITALS:   Vital Signs Last 24 Hrs  T(C): 37 (07 Oct 2024 05:16), Max: 37 (07 Oct 2024 05:16)  T(F): 98.6 (07 Oct 2024 05:16), Max: 98.6 (07 Oct 2024 05:16)  HR: 73 (07 Oct 2024 05:16) (73 - 102)  BP: 113/74 (07 Oct 2024 05:16) (110/65 - 113/74)  BP(mean): --  RR: 18 (07 Oct 2024 05:16) (18 - 18)  SpO2: 97% (07 Oct 2024 05:16) (97% - 97%)    Parameters below as of 07 Oct 2024 05:16  Patient On (Oxygen Delivery Method): room air    ---  PHYSICAL EXAM:   General: Awake and alert, cooperative with exam. No acute distress.   Cardiology: Normal S1, S2. No murmurs. Regular rate and rhythm.   Respiratory: Lungs clear to ascultation bilaterally. No wheezes, rales, or rhonchi.   Gastrointestinal: Positive bowel sounds. Soft. Non-tender. Non-distended. No guarding, rigidity, or rebound tenderness.  Extremities: No peripheral edema bilaterally.  Neurological: A+Ox3. CN 2-12 intact. No focal neurological deficits. Normal speech. No facial droop.   --- Feli Alberts is 54 y/o female  with  PMH of insomnia x 9 years with no improvements,  presented to  ED from home with possible seizure, pt reports waking up normal today around noon felt heaviness in her chest, retrosternal, no radiation, then started feeling tingling of her hands arms and feet and then legs, felt weak and could not move her body, she called her sister who called EMS. Patient stated a episode assocated with dizziness. EMS noted some clonic movements of the body. PT does not remember clearly  what happened in the ambulance, In the ED, episode resolved noted, AA0 X 3, no neuro deficits. Denied cough, sob, dysuria, n/v, belly pain. Imagining showed CT head negative,  sinus tachy, Lactate 3.7. Patient was given NS bolus 1L. Patient was admitted to the floor for further management. Cardiologist consulted and recommended outpatient management for Stress Test. Neurologist was consulted and recommended EEG which did not showed any Seizure Activity . Psychiatrist was consulted and recommended trial of Trazodone 25mg at bedtime.  On 10/07/2024, Patient was seen and examined at bedside. He is stable and medically optimized for discharge. Patient will continue with Trazodone 25mg at bedtime and Xanax  . We recommend following up with the Psychiatrist and Therapist for management of Anxiety and Insomnia. We recommend to following with Cardiologist for possible Stress Test.     ---  VITALS:   Vital Signs Last 24 Hrs  T(C): 37 (07 Oct 2024 05:16), Max: 37 (07 Oct 2024 05:16)  T(F): 98.6 (07 Oct 2024 05:16), Max: 98.6 (07 Oct 2024 05:16)  HR: 73 (07 Oct 2024 05:16) (73 - 102)  BP: 113/74 (07 Oct 2024 05:16) (110/65 - 113/74)  BP(mean): --  RR: 18 (07 Oct 2024 05:16) (18 - 18)  SpO2: 97% (07 Oct 2024 05:16) (97% - 97%)    Parameters below as of 07 Oct 2024 05:16  Patient On (Oxygen Delivery Method): room air    ---  PHYSICAL EXAM:   General: Awake and alert, cooperative with exam. No acute distress.   Cardiology: Normal S1, S2. No murmurs. Regular rate and rhythm.   Respiratory: Lungs clear to ascultation bilaterally. No wheezes, rales, or rhonchi.   Gastrointestinal: Positive bowel sounds. Soft. Non-tender. Non-distended. No guarding, rigidity, or rebound tenderness.  Extremities: No peripheral edema bilaterally.  Neurological: A+Ox3. CN 2-12 intact. No focal neurological deficits. Normal speech. No facial droop.   --- Feli Alberts is 54 y/o female  with  PMH of insomnia x 9 years with no improvements,  presented to  ED from home with possible seizure, pt reports waking up normal today around noon felt heaviness in her chest, retrosternal, no radiation, then started feeling tingling of her hands arms and feet and then legs, felt weak and could not move her body, she called her sister who called EMS. Patient stated a episode assocated with dizziness. EMS noted some clonic movements of the body. PT does not remember clearly  what happened in the ambulance, In the ED, episode resolved noted, AA0 X 3, no neuro deficits. Denied cough, sob, dysuria, n/v, belly pain. Imagining showed CT head negative,  sinus tachy, Lactate 3.7. Patient was given NS bolus 1L. Patient was admitted to the floor for further management. Cardiologist consulted and recommended outpatient management for Stress Test. Neurologist was consulted and recommended EEG which did not showed any epileptiform activity . Psychiatry was consulted and recommended trial of Trazodone 25mg at bedtime.    10/07/2024: Patient was seen and examined at bedside. He is stable and medically optimized for discharge. Patient will continue with Trazodone 25mg at bedtime and Xanax. Recommend following up with the Psychiatrist and Therapist for management of Anxiety and Insomnia. Recommend following with Cardiologist for possible outpatient stress test. We also recommend outpatient follow-up with PCP.    ---  VITALS:   Vital Signs Last 24 Hrs  T(C): 37 (07 Oct 2024 05:16), Max: 37 (07 Oct 2024 05:16)  T(F): 98.6 (07 Oct 2024 05:16), Max: 98.6 (07 Oct 2024 05:16)  HR: 73 (07 Oct 2024 05:16) (73 - 102)  BP: 113/74 (07 Oct 2024 05:16) (110/65 - 113/74)  BP(mean): --  RR: 18 (07 Oct 2024 05:16) (18 - 18)  SpO2: 97% (07 Oct 2024 05:16) (97% - 97%)    Parameters below as of 07 Oct 2024 05:16  Patient On (Oxygen Delivery Method): room air    ---  PHYSICAL EXAM:   General: Awake and alert, cooperative with exam. No acute distress.   Cardiology: Normal S1, S2. No murmurs. Regular rate and rhythm.   Respiratory: Lungs clear to ascultation bilaterally. No wheezes, rales, or rhonchi.   Gastrointestinal: Positive bowel sounds. Soft. Non-tender. Non-distended. No guarding, rigidity, or rebound tenderness.  Extremities: No peripheral edema bilaterally.  Neurological: A+Ox3. CN 2-12 intact. No focal neurological deficits. Normal speech. No facial droop.   ---

## 2024-10-05 NOTE — PHYSICAL THERAPY INITIAL EVALUATION ADULT - STRENGTHENING, PT EVAL
In 1-3 treats pt will initiate strengthening program for UE/LE to aide in the resolution of weakness

## 2024-10-05 NOTE — PROGRESS NOTE ADULT - ASSESSMENT
53 y o f  with  PMH of insomnia,  BIBA from home with possible seizure, pt reports waking up normal today around noon felt heaviness in her chest, retrosternal, no radiation, then started feeling tingling of her hands arms and feet anf then legs, felt weak and could not move her body, she called her sister who called EMS. episode assocated with dizziness. EMS noted some clonic movements of the body. PT  does not remember clearly  what happened in the ambulance,   episode resolved after arrival to ED.    #weakness, presyncopal episode, r/o Seizure episode  #lactic acidosis- resolved with IVF  admit to tele  cardiac monitoring  cta head, neck- no acute changes   trend trops  TTE in am   EKG  in am  am labs  cardio recs appreciated  -psych consult  -neuro consult  -PT consult  dr. toledo notified  neuro checks q4.  EEG in am  asp precautions  IV fluids- received 1 LNS in ED, will continue ivf    #Hypokalemia  replete  monitor    #Insomnia  -Says melatonin does not help at all  -Will try small dose of xanax- .25 qhs prn    #GERD   ppi    #VTE PPX   PAS

## 2024-10-05 NOTE — DISCHARGE NOTE PROVIDER - NSDCMRMEDTOKEN_GEN_ALL_CORE_FT
Xanax 0.5 mg oral tablet: 1 tab(s) orally once a day As needed anxiety   traZODone 50 mg oral tablet: 0.5 tab(s) orally once a day (at bedtime) MDD: 1  Xanax 0.5 mg oral tablet: 1 tab(s) orally once a day as needed for anxiety MDD: 1

## 2024-10-05 NOTE — DISCHARGE NOTE PROVIDER - NSDCCPCAREPLAN_GEN_ALL_CORE_FT
PRINCIPAL DISCHARGE DIAGNOSIS  Diagnosis: Syncope, near  Assessment and Plan of Treatment: Your were admitted to the hospital for presyncope. We rule out Cardiac or Neurologic cause, since all your test and exam were negative. Unfortunally we counld not found and exact cause, but  maybe your symptons were trigger by your recent increase in Stress and Axiety. We recommend to follow up with your PCP for further managament.      SECONDARY DISCHARGE DIAGNOSES  Diagnosis: Insomnia  Assessment and Plan of Treatment: You has a history of Chronic Insomnia. We recommend to follow up with you PCP to disscuss ways to help you sleep better    Diagnosis: Panic attack  Assessment and Plan of Treatment: Sometimes Panic attacks caused by increased Anxiety can increase heart rate, shortness of breath, dizzines, trembling and muscule weakness and tension that can be frecuent and unespected. We recommend to follow up with a Psychiatrist to help you managing your symptons.

## 2024-10-05 NOTE — PHYSICAL THERAPY INITIAL EVALUATION ADULT - ADDITIONAL COMMENTS
Pt lives with her  in a private home. 1 flight of stairs within the home with a railing. Pt does not own any DME.

## 2024-10-06 LAB
ANION GAP SERPL CALC-SCNC: 11 MMOL/L — SIGNIFICANT CHANGE UP (ref 5–17)
BUN SERPL-MCNC: 11 MG/DL — SIGNIFICANT CHANGE UP (ref 7–23)
CALCIUM SERPL-MCNC: 9.6 MG/DL — SIGNIFICANT CHANGE UP (ref 8.4–10.5)
CHLORIDE SERPL-SCNC: 107 MMOL/L — SIGNIFICANT CHANGE UP (ref 96–108)
CO2 SERPL-SCNC: 25 MMOL/L — SIGNIFICANT CHANGE UP (ref 22–31)
CREAT SERPL-MCNC: 0.87 MG/DL — SIGNIFICANT CHANGE UP (ref 0.5–1.3)
EGFR: 79 ML/MIN/1.73M2 — SIGNIFICANT CHANGE UP
GLUCOSE SERPL-MCNC: 105 MG/DL — HIGH (ref 70–99)
POTASSIUM SERPL-MCNC: 3.6 MMOL/L — SIGNIFICANT CHANGE UP (ref 3.5–5.3)
POTASSIUM SERPL-SCNC: 3.6 MMOL/L — SIGNIFICANT CHANGE UP (ref 3.5–5.3)
SODIUM SERPL-SCNC: 143 MMOL/L — SIGNIFICANT CHANGE UP (ref 135–145)

## 2024-10-06 PROCEDURE — 99232 SBSQ HOSP IP/OBS MODERATE 35: CPT

## 2024-10-06 PROCEDURE — 99233 SBSQ HOSP IP/OBS HIGH 50: CPT

## 2024-10-06 RX ORDER — ALPRAZOLAM 0.5 MG/1
0.5 TABLET ORAL DAILY
Refills: 0 | Status: DISCONTINUED | OUTPATIENT
Start: 2024-10-06 | End: 2024-10-06

## 2024-10-06 RX ORDER — ALPRAZOLAM 0.5 MG/1
0.5 TABLET ORAL DAILY
Refills: 0 | Status: DISCONTINUED | OUTPATIENT
Start: 2024-10-07 | End: 2024-10-07

## 2024-10-06 RX ORDER — ALPRAZOLAM 0.5 MG/1
0.25 TABLET ORAL ONCE
Refills: 0 | Status: DISCONTINUED | OUTPATIENT
Start: 2024-10-06 | End: 2024-10-06

## 2024-10-06 RX ADMIN — ALPRAZOLAM 0.25 MILLIGRAM(S): 0.5 TABLET ORAL at 23:17

## 2024-10-06 RX ADMIN — Medication 100 MILLILITER(S): at 12:22

## 2024-10-06 RX ADMIN — ALPRAZOLAM 0.25 MILLIGRAM(S): 0.5 TABLET ORAL at 12:22

## 2024-10-06 RX ADMIN — PANTOPRAZOLE SODIUM 40 MILLIGRAM(S): 40 TABLET, DELAYED RELEASE ORAL at 05:13

## 2024-10-06 NOTE — CONSULT NOTE ADULT - SUBJECTIVE AND OBJECTIVE BOX
HPI:This is a 53-year-old woman who presents to the ER after a constellation of symptoms.  The patient was in her usual state of health which she describes as quite good when she suddenly felt palpitations followed by extreme coldness in both her hands followed by taking a shower at which point she felt as if her tongue was swelling up and she felt numbness throughout her whole face.  According to the medical record she apparently had a seizure in the ambulance although the patient denies this.  She states that she never had any similar issues to this in the past.  She has no prior history of heart disease.  She has no history of hypertension diabetes smoking or alcohol use she believes her cholesterol is all right.  Family history significant for hypertension.  The patient does seem to think that she has issues with anxiety currently.     PMH:   GERD (gastroesophageal reflux disease)    Asthma      PSH:   No significant past surgical history    Hernia, inguinal, right      Family History:  FAMILY HISTORY:HTN      Social History:  Smoking:denies  Alcohol:denies  Drugs:denies    Allergies:  Motrin (Unknown)  dust (Sneezing; Rhinorrhea)  penicillin (Unknown)      Medications:  acetaminophen     Tablet .. 650 milliGRAM(s) Oral every 6 hours PRN  ALPRAZolam 0.25 milliGRAM(s) Oral at bedtime PRN  aluminum hydroxide/magnesium hydroxide/simethicone Suspension 30 milliLiter(s) Oral every 4 hours PRN  melatonin 3 milliGRAM(s) Oral at bedtime PRN  ondansetron Injectable 4 milliGRAM(s) IV Push every 8 hours PRN  pantoprazole    Tablet 40 milliGRAM(s) Oral before breakfast  sodium chloride 0.9%. 1000 milliLiter(s) IV Continuous <Continuous>        Physical Exam:  T(C): 36.4 (10-05-24 @ 05:25), Max: 36.8 (10-04-24 @ 21:47)  HR: 60 (10-05-24 @ 05:25) (60 - 118)  BP: 125/77 (10-05-24 @ 05:25) (110/80 - 173/83)  RR: 18 (10-05-24 @ 05:25) (18 - 20)  SpO2: 97% (10-05-24 @ 05:25) (97% - 100%)  Wt(kg): --    ,  NECK: Supple, No JVD, no bruits  CHEST/LUNG: Clear to percussion bilaterally; No rales, rhonchi, wheezing, or rubs  HEART: Regular rate and rhythm; No murmurs, rubs, or gallops PMI non displaced.        Cardiovascular Diagnostic Testing:  ECG:initial ekg revealed sinus tach with artifact and question of st depression which could have been artifiactual  second ekg normal    Labs:                        12.2   7.93  )-----------( 297      ( 05 Oct 2024 06:00 )             36.1     10-05    140  |  107  |  9   ----------------------------<  81  3.8   |  22  |  0.84    Ca    8.8      05 Oct 2024 06:00  Mg     1.7     10-04    TPro  6.5  /  Alb  3.4  /  TBili  0.7  /  DBili  x   /  AST  15  /  ALT  20  /  AlkPhos  70  10-05    PT/INR - ( 04 Oct 2024 15:40 )   PT: 11.7 sec;   INR: 0.99 ratio         PTT - ( 04 Oct 2024 15:40 )  PTT:32.3 sec                Imaging:  
Neurology consult    TORREY [id number]    HPI:  53 y o f  with  PMH of insomnia,  BIBA from home with possible seizure, pt reports waking up normal today around noon felt heaviness in her chest, retrosternal, no radiation, then started feeling tingling of her hands arms and feet anf then legs, felt weak and could not move her body, she called her sister who called EMS. episode assocated with dizziness. EMS noted some clonic movements of the body. PT  does not remember clearly  what happened in the ambulance,   episode resolved after arrival to ED.    on admission she is AA0 X 3, no neuro deficits.     reports she is unable to sleep at night and has insomnia x 9 yrs. she has  been seen by psyh  but no improvement    denies cough, sob, dysuria, n/v, belly pain.     ed course- ct head negative, ekg sinus tachy, lactate 3.7  received NS bolus 1L  (04 Oct 2024 18:03)    Patient reports she felt cheat heaviness, and palpitation, felt tired. She got up and took shower. After that she still felt unwell, able to walk back to her room and called her sister. Denies convulsion. Admit felt panic.       MEDICATIONS    acetaminophen     Tablet .. 650 milliGRAM(s) Oral every 6 hours PRN  ALPRAZolam 0.25 milliGRAM(s) Oral at bedtime PRN  aluminum hydroxide/magnesium hydroxide/simethicone Suspension 30 milliLiter(s) Oral every 4 hours PRN  melatonin 3 milliGRAM(s) Oral at bedtime PRN  ondansetron Injectable 4 milliGRAM(s) IV Push every 8 hours PRN  pantoprazole    Tablet 40 milliGRAM(s) Oral before breakfast  sodium chloride 0.9%. 1000 milliLiter(s) IV Continuous <Continuous>         Family history: No history of dementia, strokes, or seizures   FAMILY HISTORY:    SOCIAL HISTORY -- No history of tobacco or alcohol use     Allergies    Motrin (Unknown)  dust (Sneezing; Rhinorrhea)  penicillin (Unknown)    Intolerances            Vital Signs Last 24 Hrs  T(C): 36.6 (06 Oct 2024 05:05), Max: 36.9 (05 Oct 2024 12:31)  T(F): 97.8 (06 Oct 2024 05:05), Max: 98.4 (05 Oct 2024 12:31)  HR: 68 (06 Oct 2024 05:05) (67 - 90)  BP: 120/75 (06 Oct 2024 05:05) (116/82 - 121/79)  BP(mean): 98 (05 Oct 2024 12:31) (98 - 98)  RR: 17 (06 Oct 2024 05:05) (17 - 18)  SpO2: 98% (06 Oct 2024 05:05) (98% - 100%)    Parameters below as of 06 Oct 2024 05:05  Patient On (Oxygen Delivery Method): room air            On Neurological Examination:    Mental Status - Patient is alert, awake, oriented X3.     Follows commands well and able to answer questions appropriately.   Speech -   Fluent, coherent and clear. No Aphasia                              Cranial Nerves - grossly intact  Motor Exam - 5/5  Muscle tone - is normal all over. No asymmetry is seen.      Sensory    Bilateral intact to light touch    Gait -  normal      LABS:  CBC Full  -  ( 05 Oct 2024 06:00 )  WBC Count : 7.93 K/uL  RBC Count : 3.96 M/uL  Hemoglobin : 12.2 g/dL  Hematocrit : 36.1 %  Platelet Count - Automated : 297 K/uL  Mean Cell Volume : 91.2 fl  Mean Cell Hemoglobin : 30.8 pg  Mean Cell Hemoglobin Concentration : 33.8 gm/dL  Auto Neutrophil # : 4.03 K/uL  Auto Lymphocyte # : 3.07 K/uL  Auto Monocyte # : 0.70 K/uL  Auto Eosinophil # : 0.07 K/uL  Auto Basophil # : 0.05 K/uL  Auto Neutrophil % : 50.9 %  Auto Lymphocyte % : 38.7 %  Auto Monocyte % : 8.8 %  Auto Eosinophil % : 0.9 %  Auto Basophil % : 0.6 %        10-06    143  |  107  |  11  ----------------------------<  105[H]  3.6   |  25  |  0.87    Ca    9.6      06 Oct 2024 06:46  Mg     1.7     10-04    TPro  6.5  /  Alb  3.4  /  TBili  0.7  /  DBili  x   /  AST  15  /  ALT  20  /  AlkPhos  70  10-05        ACC: [id number] EXAM:  CT ANGIO NECK (W)AW IC   ORDERED BY: DOMINIK FARLEY     ACC: [id number] EXAM:  CT ANGIO BRAIN (W)AW IC   ORDERED BY: DOMINIK MARTINE     ACC: [id number] EXAM:  CT BRAIN   ORDERED BY: DOMINIK FARLEY     PROCEDURE DATE:  10/04/2024          INTERPRETATION:  CLINICAL INFORMATION: generalized weakness; pmhx of   insomnia p/w episode of "I cant move and feeling w    COMPARISON: MRI August 10, 2022.    Multiple axial sections were performed from the base skull to vertex   without contrast enhancement. Coronal and sagittal reconstructions were   performed    The patient was injected approximately 90 cc of Omnipaque C3 50 IV. CTA   of neck and Pala of Burris was performed. Coronal and sagittal   reconstructions were performed. 3-D MIPS were performed    The lateral ventricles have a normal configuration    There is no acute hemorrhage mass or mass effect seen.    Evaluation of the osseous structures with the appropriate window appears   normal    The visualized paranasal sinuses mastoid and middle ear regions are clear.    Both distal common carotid, proximal internal and external carotid   arteries appear normal as well as both vertebral arteries. 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 Elevation of the anterior   cerebral artery appears normal    Both middle cerebral arteries appear normal as well as the basilar and   bilateral posterior cerebral arteries    The dural venous sinuses demonstrate normal enhancement    Evaluation of the soft tissue neck region appears normal    The visualized portions of both lung apices appear clear.    IMPRESSION: Unremarkable noncontrast head CT, CTA of the neck and Pala   of Burris.        --- End of Report ---            KIM ESPINOZA MD; Attending Radiologist  This document has been electronically signed. Oct  4 2024  5:30PM

## 2024-10-06 NOTE — PROGRESS NOTE ADULT - ATTENDING COMMENTS
agree with above  seen and examined on 10/5 rounds  neuro, cardio consulted  f/u EEG  f/u am labs
agree with above  neuro and cardio recs noted  eeg pending, tech will be in AM of 10/7 to complete study  f/u am labs  psych consult for uncontrolled anxiety

## 2024-10-06 NOTE — CONSULT NOTE ADULT - TIME BILLING
chart reviewed, face to face with pt for history and physical exam, discussed with care team. documentation

## 2024-10-06 NOTE — PROGRESS NOTE ADULT - ASSESSMENT
53 y o f  with  PMH of insomnia,  BIBA from home with possible seizure, pt reports waking up normal today around noon felt heaviness in her chest, retrosternal, no radiation, then started feeling tingling of her hands arms and feet anf then legs, felt weak and could not move her body, she called her sister who called EMS. episode assocated with dizziness. EMS noted some clonic movements of the body. PT  does not remember clearly  what happened in the ambulance,   episode resolved after arrival to ED.    #weakness, presyncopal episode, r/o Seizure episode  #lactic acidosis- resolved with IVF  admit to tele  cardiac monitoring  cta head, neck- no acute changes   trend trops  TTE in am   EKG  in am  am labs  cardio recs appreciated  -psych consult  -neuro consult  -PT consult  dr. toledo notified  neuro checks q4.  EEG in am  asp precautions  IV fluids- received 1 LNS in ED, will continue ivf    #Hypokalemia  replete  monitor    #Insomnia  -Says melatonin does not help at all  -Will try small dose of xanax- .25 qhs prn    #GERD   ppi  aluminion hydroxide/ simeticone solution     #VTE PPX   PAS             53 y o f  with  PMH of insomnia,  BIBA from home with possible seizure, pt reports waking up normal today around noon felt heaviness in her chest, retrosternal, no radiation, then started feeling tingling of her hands arms and feet anf then legs, felt weak and could not move her body, she called her sister who called EMS. episode assocated with dizziness. EMS noted some clonic movements of the body. PT  does not remember clearly  what happened in the ambulance,   episode resolved after arrival to ED.    #weakness, presyncopal episode, r/o Seizure episode  #lactic acidosis- resolved with IVF  admit to tele  cardiac monitoring  cta head, neck- no acute changes   trend trops  TTE in am   EKG  in am  cardio recs appreciated  -psych consult  -neuro input appreciated pending EEG,  -PT consult: Outpt PT  neuro checks q4.  EEG in am  asp precautions  IV fluids- received 1 LNS in ED, will continue ivf    #Hypokalemia  replete  monitor    #Insomnia  -Says melatonin does not help at all  -Increase xanax 0.25 BID.     #GERD   ppi  aluminion hydroxide/ simeticone solution     #VTE PPX   PAS             53 y o f  with  PMH of insomnia,  BIBA from home with possible seizure, pt reports waking up normal today around noon felt heaviness in her chest, retrosternal, no radiation, then started feeling tingling of her hands arms and feet anf then legs, felt weak and could not move her body, she called her sister who called EMS. episode assocated with dizziness. EMS noted some clonic movements of the body. PT  does not remember clearly  what happened in the ambulance,   episode resolved after arrival to ED.    #weakness, presyncopal episode, r/o Seizure episode  #lactic acidosis- resolved with IVF  admit to tele  cardiac monitoring  cta head, neck- no acute changes   trend trops  TTE done  cardio recs appreciated  -psych consult for AM  -neuro input appreciated pending EEG,  -PT consult: Outpt PT  neuro checks q4.  EEG to be done AM of 10/7  asp precautions  IV fluids- received 1 LNS in ED, will continue ivf    #Hypokalemia  replete  monitor    #Insomnia  -Says melatonin does not help at all  -Increase xanax 0.25 BID.     #GERD   ppi  aluminion hydroxide/ simeticone solution     #VTE PPX   PAS      care plan d/w pt at bedside, all questions answered

## 2024-10-06 NOTE — CONSULT NOTE ADULT - ASSESSMENT
Ms. Alberts is a 54 yo woman here for chest heaviness and concerns for seizure.  NO focal neurological deficits.  From her clinical presentation, it could be panic attack, would like to rule out seizure.  EEG pending. If negative, pt can be DC from neurology standpoint. Pt to follow with her psy and neurology once dc.  psy consult  CT head negative.  would not start AED at this time.  the rest of care per primary team  
 in summary, the patient is a 53-year-old woman with no significant past cardiac or medical history for that matter.  Who came in for a constellation of symptoms the only one related to the cardiac system would seem to be the palpitations she is maintaining sinus rhythm at this time.  Her initial EKG however was equivocal given the ST depression but in the face of significant artifact    There is no evidence of acute cardiac  syndrome symptomatically or enzymatically.    For now would get echocardiogram to assess for any wall motion abnormalities    Patient most probably should have both neurologic and psychiatric assessments while as an inpatient    Will reassess cardiologically tomorrow

## 2024-10-06 NOTE — PROGRESS NOTE ADULT - ASSESSMENT
in summary, the patient is a 53-year-old woman with no significant risk factors for coronary disease who presented with a constellation of symptoms that most probably are in keeping with anxiety.  The patient seems to think that as well.    For now apparently she is to undergo an EEG tomorrow.    Given the absence of any objective findings of myocardial ischemia exercise stress testing could be deferred to outpatient

## 2024-10-06 NOTE — PROGRESS NOTE ADULT - SUBJECTIVE AND OBJECTIVE BOX
Follow up for cardiac issues  SUBJ:Patient has no specific complaints except for the fact that she would like to go home she states she cannot sleep in the hospital.  PMH  GERD (gastroesophageal reflux disease)    Asthma        MEDICATIONS  (STANDING):  pantoprazole    Tablet 40 milliGRAM(s) Oral before breakfast  sodium chloride 0.9%. 1000 milliLiter(s) (100 mL/Hr) IV Continuous <Continuous>    MEDICATIONS  (PRN):  acetaminophen     Tablet .. 650 milliGRAM(s) Oral every 6 hours PRN Temp greater or equal to 38C (100.4F), Mild Pain (1 - 3)  ALPRAZolam 0.25 milliGRAM(s) Oral at bedtime PRN anxiety  aluminum hydroxide/magnesium hydroxide/simethicone Suspension 30 milliLiter(s) Oral every 4 hours PRN Dyspepsia  melatonin 3 milliGRAM(s) Oral at bedtime PRN Insomnia  ondansetron Injectable 4 milliGRAM(s) IV Push every 8 hours PRN Nausea and/or Vomiting        PHYSICAL EXAM:  Vital Signs Last 24 Hrs  T(C): 36.9 (06 Oct 2024 11:41), Max: 36.9 (06 Oct 2024 11:41)  T(F): 98.5 (06 Oct 2024 11:41), Max: 98.5 (06 Oct 2024 11:41)  HR: 74 (06 Oct 2024 11:41) (67 - 74)  BP: 124/80 (06 Oct 2024 11:41) (120/75 - 124/80)  BP(mean): --  RR: 18 (06 Oct 2024 11:41) (17 - 18)  SpO2: 99% (06 Oct 2024 11:41) (98% - 100%)    Parameters below as of 06 Oct 2024 11:41  Patient On (Oxygen Delivery Method): room air          NECK: Supple, No JVD, no bruits  CHEST/LUNG: Clear to percussion bilaterally; No rales, rhonchi, wheezing, or rubs  HEART: Regular rate and rhythm; No murmurs, rubs, or gallops PMI non displaced.        TELEMETRY:rsr    LABS:                        12.2   7.93  )-----------( 297      ( 05 Oct 2024 06:00 )             36.1     10-06    143  |  107  |  11  ----------------------------<  105[H]  3.6   |  25  |  0.87    Ca    9.6      06 Oct 2024 06:46  Mg     1.7     10-04    TPro  6.5  /  Alb  3.4  /  TBili  0.7  /  DBili  x   /  AST  15  /  ALT  20  /  AlkPhos  70  10-05        PT/INR - ( 04 Oct 2024 15:40 )   PT: 11.7 sec;   INR: 0.99 ratio         PTT - ( 04 Oct 2024 15:40 )  PTT:32.3 sec    I&O's Summary    05 Oct 2024 07:01  -  06 Oct 2024 07:00  --------------------------------------------------------  IN: 1200 mL / OUT: 600 mL / NET: 600 mL      BNP    RADIOLOGY & ADDITIONAL STUDIES:    ECHO:

## 2024-10-06 NOTE — PROGRESS NOTE ADULT - SUBJECTIVE AND OBJECTIVE BOX
INTERVAL HPI/ OVERNIGHTS  EVENTS: NONE  Patient was seen and examined at bedside.       PHYSICAL EXAM:  Vital Signs Last 24 Hrs  T(C): 36.6 (06 Oct 2024 05:05), Max: 36.9 (05 Oct 2024 12:31)  T(F): 97.8 (06 Oct 2024 05:05), Max: 98.4 (05 Oct 2024 12:31)  HR: 68 (06 Oct 2024 05:05) (67 - 90)  BP: 120/75 (06 Oct 2024 05:05) (116/82 - 121/79)  BP(mean): 98 (05 Oct 2024 12:31) (98 - 98)  RR: 17 (06 Oct 2024 05:05) (17 - 18)  SpO2: 98% (06 Oct 2024 05:05) (98% - 100%)    Parameters below as of 06 Oct 2024 05:05  Patient On (Oxygen Delivery Method): room air            Constitutional: Pt lying in bed, awake and alert, NAD  HEENT: EOMI, normal hearing, moist mucous membranes  Neck: Soft and supple, no JVD  Respiratory: CTABL, No wheezing, rales or rhonchi  Cardiovascular: S1S2+, RRR, no M/G/R  Gastrointestinal: BS+, soft, NT/ND, no guarding, no rebound  Extremities: No peripheral edema  Vascular: 2+ peripheral pulses  Neurological: AAOx3, no focal deficits  Musculoskeletal: 5/5 strength b/l upper and lower extremities  Skin: No rashes    MEDICATIONS:  MEDICATIONS  (STANDING):  pantoprazole    Tablet 40 milliGRAM(s) Oral before breakfast  sodium chloride 0.9%. 1000 milliLiter(s) (100 mL/Hr) IV Continuous <Continuous>      LABS: All Labs Reviewed:                        12.2   7.93  )-----------( 297      ( 05 Oct 2024 06:00 )             36.1       10-05    140  |  107  |  9   ----------------------------<  81  3.8   |  22  |  0.84    Ca    8.8      05 Oct 2024 06:00  Mg     1.7     10-04    TPro  6.5  /  Alb  3.4  /  TBili  0.7  /  DBili  x   /  AST  15  /  ALT  20  /  AlkPhos  70  10-05              Urinalysis Basic - ( 05 Oct 2024 06:00 )    Color: x / Appearance: x / SG: x / pH: x  Gluc: 81 mg/dL / Ketone: x  / Bili: x / Urobili: x   Blood: x / Protein: x / Nitrite: x   Leuk Esterase: x / RBC: x / WBC x   Sq Epi: x / Non Sq Epi: x / Bacteria: x        PT/INR - ( 04 Oct 2024 15:40 )   PT: 11.7 sec;   INR: 0.99 ratio         PTT - ( 04 Oct 2024 15:40 )  PTT:32.3 sec        POCT Blood Glucose.: 158 mg/dL (04 Oct 2024 15:24)          RADIOLOGY/EKG:  < from: Xray Chest 1 View- PORTABLE-Urgent (Xray Chest 1 View- PORTABLE-Urgent .) (10.04.24 @ 17:58) >  Single frontal view of the chest demonstrates the lungs to be clear. The   cardiomediastinal silhouette is unremarkable. No acute osseous   abnormalities. Overlying EKG leads and wires are noted    IMPRESSION: No acute cardiopulmonary disease process.    < from: CT Angio Neck w/ IV Cont (10.04.24 @ 17:06) >  MPRESSION: Unremarkable noncontrast head CT, CTA of the neck and [place]   [place].       INTERVAL HPI/ OVERNIGHTS  EVENTS: NONE  Patient was seen and examined at bedside. She is upset stating that nobody is taking care of her. Bathroom is dirty and nurses does not change her IV line daily. Getting more anxious because she is unable to sleep and the hospital wants to keep her in the hospital to take money from the insurance.       PHYSICAL EXAM:  Vital Signs Last 24 Hrs  T(C): 36.6 (06 Oct 2024 05:05), Max: 36.9 (05 Oct 2024 12:31)  T(F): 97.8 (06 Oct 2024 05:05), Max: 98.4 (05 Oct 2024 12:31)  HR: 68 (06 Oct 2024 05:05) (67 - 90)  BP: 120/75 (06 Oct 2024 05:05) (116/82 - 121/79)  BP(mean): 98 (05 Oct 2024 12:31) (98 - 98)  RR: 17 (06 Oct 2024 05:05) (17 - 18)  SpO2: 98% (06 Oct 2024 05:05) (98% - 100%)    Parameters below as of 06 Oct 2024 05:05  Patient On (Oxygen Delivery Method): room air            Constitutional: Pt lying in bed, awake and alert, NAD  HEENT: EOMI, normal hearing, moist mucous membranes  Neck: Soft and supple, no JVD  Respiratory: CTABL, No wheezing, rales or rhonchi  Cardiovascular: S1S2+, RRR, no M/G/R  Gastrointestinal: BS+, soft, NT/ND, no guarding, no rebound  Extremities: No peripheral edema  Vascular: 2+ peripheral pulses  Neurological: AAOx3, no focal deficits  Musculoskeletal: 5/5 strength b/l upper and lower extremities  Skin: No rashes    MEDICATIONS:  MEDICATIONS  (STANDING):  pantoprazole    Tablet 40 milliGRAM(s) Oral before breakfast  sodium chloride 0.9%. 1000 milliLiter(s) (100 mL/Hr) IV Continuous <Continuous>      LABS: All Labs Reviewed:                        12.2   7.93  )-----------( 297      ( 05 Oct 2024 06:00 )             36.1       10-05    140  |  107  |  9   ----------------------------<  81  3.8   |  22  |  0.84    Ca    8.8      05 Oct 2024 06:00  Mg     1.7     10-04    TPro  6.5  /  Alb  3.4  /  TBili  0.7  /  DBili  x   /  AST  15  /  ALT  20  /  AlkPhos  70  10-05              Urinalysis Basic - ( 05 Oct 2024 06:00 )    Color: x / Appearance: x / SG: x / pH: x  Gluc: 81 mg/dL / Ketone: x  / Bili: x / Urobili: x   Blood: x / Protein: x / Nitrite: x   Leuk Esterase: x / RBC: x / WBC x   Sq Epi: x / Non Sq Epi: x / Bacteria: x        PT/INR - ( 04 Oct 2024 15:40 )   PT: 11.7 sec;   INR: 0.99 ratio         PTT - ( 04 Oct 2024 15:40 )  PTT:32.3 sec        POCT Blood Glucose.: 158 mg/dL (04 Oct 2024 15:24)          RADIOLOGY/EKG:  < from: Xray Chest 1 View- PORTABLE-Urgent (Xray Chest 1 View- PORTABLE-Urgent .) (10.04.24 @ 17:58) >  Single frontal view of the chest demonstrates the lungs to be clear. The   cardiomediastinal silhouette is unremarkable. No acute osseous   abnormalities. Overlying EKG leads and wires are noted    IMPRESSION: No acute cardiopulmonary disease process.    < from: CT Angio Neck w/ IV Cont (10.04.24 @ 17:06) >  MPRESSION: Unremarkable noncontrast head CT, CTA of the neck and [place]   [place].       INTERVAL HPI/ OVERNIGHTS  EVENTS: NONE  Patient was seen and examined at bedside. She is upset stating that nobody is taking care of her. Getting more anxious because she is unable to sleep and the hospital wants to keep her in  to take money from the insurance.       PHYSICAL EXAM:  Vital Signs Last 24 Hrs  T(C): 36.6 (06 Oct 2024 05:05), Max: 36.9 (05 Oct 2024 12:31)  T(F): 97.8 (06 Oct 2024 05:05), Max: 98.4 (05 Oct 2024 12:31)  HR: 68 (06 Oct 2024 05:05) (67 - 90)  BP: 120/75 (06 Oct 2024 05:05) (116/82 - 121/79)  BP(mean): 98 (05 Oct 2024 12:31) (98 - 98)  RR: 17 (06 Oct 2024 05:05) (17 - 18)  SpO2: 98% (06 Oct 2024 05:05) (98% - 100%)    Parameters below as of 06 Oct 2024 05:05  Patient On (Oxygen Delivery Method): room air            Constitutional: Pt lying in bed, awake and alert, NAD  HEENT: EOMI, normal hearing, moist mucous membranes  Neck: Soft and supple, no JVD  Respiratory: CTABL, No wheezing, rales or rhonchi  Cardiovascular: S1S2+, RRR, no M/G/R  Gastrointestinal: BS+, soft, NT/ND, no guarding, no rebound  Extremities: No peripheral edema  Vascular: 2+ peripheral pulses  Neurological: AAOx3, no focal deficits  Musculoskeletal: 5/5 strength b/l upper and lower extremities  Skin: No rashes    MEDICATIONS:  MEDICATIONS  (STANDING):  pantoprazole    Tablet 40 milliGRAM(s) Oral before breakfast  sodium chloride 0.9%. 1000 milliLiter(s) (100 mL/Hr) IV Continuous <Continuous>      LABS: All Labs Reviewed:                        12.2   7.93  )-----------( 297      ( 05 Oct 2024 06:00 )             36.1       10-05    140  |  107  |  9   ----------------------------<  81  3.8   |  22  |  0.84    Ca    8.8      05 Oct 2024 06:00  Mg     1.7     10-04    TPro  6.5  /  Alb  3.4  /  TBili  0.7  /  DBili  x   /  AST  15  /  ALT  20  /  AlkPhos  70  10-05              Urinalysis Basic - ( 05 Oct 2024 06:00 )    Color: x / Appearance: x / SG: x / pH: x  Gluc: 81 mg/dL / Ketone: x  / Bili: x / Urobili: x   Blood: x / Protein: x / Nitrite: x   Leuk Esterase: x / RBC: x / WBC x   Sq Epi: x / Non Sq Epi: x / Bacteria: x        PT/INR - ( 04 Oct 2024 15:40 )   PT: 11.7 sec;   INR: 0.99 ratio         PTT - ( 04 Oct 2024 15:40 )  PTT:32.3 sec        POCT Blood Glucose.: 158 mg/dL (04 Oct 2024 15:24)          RADIOLOGY/EKG:  < from: Xray Chest 1 View- PORTABLE-Urgent (Xray Chest 1 View- PORTABLE-Urgent .) (10.04.24 @ 17:58) >  Single frontal view of the chest demonstrates the lungs to be clear. The   cardiomediastinal silhouette is unremarkable. No acute osseous   abnormalities. Overlying EKG leads and wires are noted    IMPRESSION: No acute cardiopulmonary disease process.    < from: CT Angio Neck w/ IV Cont (10.04.24 @ 17:06) >  MPRESSION: Unremarkable noncontrast head CT, CTA of the neck and [place]   [place].

## 2024-10-07 ENCOUNTER — TRANSCRIPTION ENCOUNTER (OUTPATIENT)
Age: 54
End: 2024-10-07

## 2024-10-07 VITALS
HEART RATE: 71 BPM | SYSTOLIC BLOOD PRESSURE: 145 MMHG | RESPIRATION RATE: 18 BRPM | DIASTOLIC BLOOD PRESSURE: 81 MMHG | TEMPERATURE: 98 F | OXYGEN SATURATION: 99 %

## 2024-10-07 DIAGNOSIS — F15.20 OTHER STIMULANT DEPENDENCE, UNCOMPLICATED: ICD-10-CM

## 2024-10-07 PROCEDURE — 93306 TTE W/DOPPLER COMPLETE: CPT

## 2024-10-07 PROCEDURE — 85730 THROMBOPLASTIN TIME PARTIAL: CPT

## 2024-10-07 PROCEDURE — 95812 EEG 41-60 MINUTES: CPT

## 2024-10-07 PROCEDURE — 80307 DRUG TEST PRSMV CHEM ANLYZR: CPT

## 2024-10-07 PROCEDURE — 97161 PT EVAL LOW COMPLEX 20 MIN: CPT

## 2024-10-07 PROCEDURE — 83605 ASSAY OF LACTIC ACID: CPT

## 2024-10-07 PROCEDURE — 70498 CT ANGIOGRAPHY NECK: CPT | Mod: MC

## 2024-10-07 PROCEDURE — 82962 GLUCOSE BLOOD TEST: CPT

## 2024-10-07 PROCEDURE — 70450 CT HEAD/BRAIN W/O DYE: CPT | Mod: MC

## 2024-10-07 PROCEDURE — 85610 PROTHROMBIN TIME: CPT

## 2024-10-07 PROCEDURE — 85025 COMPLETE CBC W/AUTO DIFF WBC: CPT

## 2024-10-07 PROCEDURE — 90792 PSYCH DIAG EVAL W/MED SRVCS: CPT

## 2024-10-07 PROCEDURE — 71045 X-RAY EXAM CHEST 1 VIEW: CPT

## 2024-10-07 PROCEDURE — G0378: CPT

## 2024-10-07 PROCEDURE — 95816 EEG AWAKE AND DROWSY: CPT | Mod: 26

## 2024-10-07 PROCEDURE — 70496 CT ANGIOGRAPHY HEAD: CPT | Mod: MC

## 2024-10-07 PROCEDURE — 84702 CHORIONIC GONADOTROPIN TEST: CPT

## 2024-10-07 PROCEDURE — 84484 ASSAY OF TROPONIN QUANT: CPT

## 2024-10-07 PROCEDURE — 99239 HOSP IP/OBS DSCHRG MGMT >30: CPT

## 2024-10-07 PROCEDURE — 83735 ASSAY OF MAGNESIUM: CPT

## 2024-10-07 PROCEDURE — 80048 BASIC METABOLIC PNL TOTAL CA: CPT

## 2024-10-07 PROCEDURE — 93005 ELECTROCARDIOGRAM TRACING: CPT

## 2024-10-07 PROCEDURE — 80053 COMPREHEN METABOLIC PANEL: CPT

## 2024-10-07 PROCEDURE — 36415 COLL VENOUS BLD VENIPUNCTURE: CPT

## 2024-10-07 RX ORDER — ALPRAZOLAM 0.5 MG/1
1 TABLET ORAL
Qty: 0 | Refills: 0 | DISCHARGE
Start: 2024-10-07

## 2024-10-07 RX ORDER — ALPRAZOLAM 0.5 MG/1
1 TABLET ORAL
Qty: 7 | Refills: 0
Start: 2024-10-07 | End: 2024-10-13

## 2024-10-07 RX ADMIN — PANTOPRAZOLE SODIUM 40 MILLIGRAM(S): 40 TABLET, DELAYED RELEASE ORAL at 05:23

## 2024-10-07 RX ADMIN — Medication 100 MILLILITER(S): at 05:23

## 2024-10-07 RX ADMIN — ALPRAZOLAM 0.5 MILLIGRAM(S): 0.5 TABLET ORAL at 11:04
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
## 2024-10-07 NOTE — PROGRESS NOTE ADULT - SUBJECTIVE AND OBJECTIVE BOX
INTERVAL HPI/ OVERNIGHTS  EVENTS: NONE  Patient was seen and examined at bedside.      PHYSICAL EXAM:    Vital Signs Last 24 Hrs  T(C): 37 (07 Oct 2024 05:16), Max: 37 (07 Oct 2024 05:16)  T(F): 98.6 (07 Oct 2024 05:16), Max: 98.6 (07 Oct 2024 05:16)  HR: 73 (07 Oct 2024 05:16) (73 - 102)  BP: 113/74 (07 Oct 2024 05:16) (110/65 - 124/80)  BP(mean): --  RR: 18 (07 Oct 2024 05:16) (18 - 18)  SpO2: 97% (07 Oct 2024 05:16) (97% - 99%)    Parameters below as of 07 Oct 2024 05:16  Patient On (Oxygen Delivery Method): room air            Constitutional: Pt lying in bed, awake and alert, NAD  HEENT: EOMI, normal hearing, moist mucous membranes  Neck: Soft and supple, no JVD  Respiratory: CTABL, No wheezing, rales or rhonchi  Cardiovascular: S1S2+, RRR, no M/G/R  Gastrointestinal: BS+, soft, NT/ND, no guarding, no rebound  Extremities: No peripheral edema  Vascular: 2+ peripheral pulses  Neurological: AAOx3, no focal deficits  Musculoskeletal: 5/5 strength b/l upper and lower extremities  Skin: No rashes    MEDICATIONS:  MEDICATIONS  (STANDING):  pantoprazole    Tablet 40 milliGRAM(s) Oral before breakfast  sodium chloride 0.9%. 1000 milliLiter(s) (100 mL/Hr) IV Continuous <Continuous>      LABS: All Labs Reviewed:                        12.2   7.93  )-----------( 297      ( 05 Oct 2024 06:00 )             36.1       10-05    140  |  107  |  9   ----------------------------<  81  3.8   |  22  |  0.84    Ca    8.8      05 Oct 2024 06:00  Mg     1.7     10-04    TPro  6.5  /  Alb  3.4  /  TBili  0.7  /  DBili  x   /  AST  15  /  ALT  20  /  AlkPhos  70  10-05              Urinalysis Basic - ( 05 Oct 2024 06:00 )    Color: x / Appearance: x / SG: x / pH: x  Gluc: 81 mg/dL / Ketone: x  / Bili: x / Urobili: x   Blood: x / Protein: x / Nitrite: x   Leuk Esterase: x / RBC: x / WBC x   Sq Epi: x / Non Sq Epi: x / Bacteria: x        PT/INR - ( 04 Oct 2024 15:40 )   PT: 11.7 sec;   INR: 0.99 ratio         PTT - ( 04 Oct 2024 15:40 )  PTT:32.3 sec        POCT Blood Glucose.: 158 mg/dL (04 Oct 2024 15:24)          RADIOLOGY/EKG:  < from: Xray Chest 1 View- PORTABLE-Urgent (Xray Chest 1 View- PORTABLE-Urgent .) (10.04.24 @ 17:58) >  Single frontal view of the chest demonstrates the lungs to be clear. The   cardiomediastinal silhouette is unremarkable. No acute osseous   abnormalities. Overlying EKG leads and wires are noted    IMPRESSION: No acute cardiopulmonary disease process.    < from: CT Angio Neck w/ IV Cont (10.04.24 @ 17:06) >  MPRESSION: Unremarkable noncontrast head CT, CTA of the neck and [place]   of [place].       INTERVAL HPI/ OVERNIGHTS  EVENTS: NONE  Patient was seen and examined at bedside. She feels okay. Denies fever, nausea, vomit, chest pain, SOB.      PHYSICAL EXAM:    Vital Signs Last 24 Hrs  T(C): 37 (07 Oct 2024 05:16), Max: 37 (07 Oct 2024 05:16)  T(F): 98.6 (07 Oct 2024 05:16), Max: 98.6 (07 Oct 2024 05:16)  HR: 73 (07 Oct 2024 05:16) (73 - 102)  BP: 113/74 (07 Oct 2024 05:16) (110/65 - 124/80)  BP(mean): --  RR: 18 (07 Oct 2024 05:16) (18 - 18)  SpO2: 97% (07 Oct 2024 05:16) (97% - 99%)    Parameters below as of 07 Oct 2024 05:16  Patient On (Oxygen Delivery Method): room air            Constitutional: Pt lying in bed, awake and alert, NAD  HEENT: EOMI, normal hearing, moist mucous membranes  Neck: Soft and supple, no JVD  Respiratory: CTABL, No wheezing, rales or rhonchi  Cardiovascular: S1S2+, RRR, no M/G/R  Gastrointestinal: BS+, soft, NT/ND, no guarding, no rebound  Extremities: No peripheral edema  Vascular: 2+ peripheral pulses  Neurological: AAOx3, no focal deficits  Musculoskeletal: 5/5 strength b/l upper and lower extremities  Skin: No rashes    MEDICATIONS:  MEDICATIONS  (STANDING):  pantoprazole    Tablet 40 milliGRAM(s) Oral before breakfast  sodium chloride 0.9%. 1000 milliLiter(s) (100 mL/Hr) IV Continuous <Continuous>      LABS: All Labs Reviewed:                        12.2   7.93  )-----------( 297      ( 05 Oct 2024 06:00 )             36.1       10-05    140  |  107  |  9   ----------------------------<  81  3.8   |  22  |  0.84    Ca    8.8      05 Oct 2024 06:00  Mg     1.7     10-04    TPro  6.5  /  Alb  3.4  /  TBili  0.7  /  DBili  x   /  AST  15  /  ALT  20  /  AlkPhos  70  10-05              Urinalysis Basic - ( 05 Oct 2024 06:00 )    Color: x / Appearance: x / SG: x / pH: x  Gluc: 81 mg/dL / Ketone: x  / Bili: x / Urobili: x   Blood: x / Protein: x / Nitrite: x   Leuk Esterase: x / RBC: x / WBC x   Sq Epi: x / Non Sq Epi: x / Bacteria: x        PT/INR - ( 04 Oct 2024 15:40 )   PT: 11.7 sec;   INR: 0.99 ratio         PTT - ( 04 Oct 2024 15:40 )  PTT:32.3 sec        POCT Blood Glucose.: 158 mg/dL (04 Oct 2024 15:24)          RADIOLOGY/EKG:  < from: Xray Chest 1 View- PORTABLE-Urgent (Xray Chest 1 View- PORTABLE-Urgent .) (10.04.24 @ 17:58) >  Single frontal view of the chest demonstrates the lungs to be clear. The   cardiomediastinal silhouette is unremarkable. No acute osseous   abnormalities. Overlying EKG leads and wires are noted    IMPRESSION: No acute cardiopulmonary disease process.    < from: CT Angio Neck w/ IV Cont (10.04.24 @ 17:06) >  MPRESSION: Unremarkable noncontrast head CT, CTA of the neck and [place]   [place].

## 2024-10-07 NOTE — BH CONSULTATION LIAISON ASSESSMENT NOTE - HPI (INCLUDE ILLNESS QUALITY, SEVERITY, DURATION, TIMING, CONTEXT, MODIFYING FACTORS, ASSOCIATED SIGNS AND SYMPTOMS)
Patient is a 53 year old female with a PMH of insomnia. She was BIBA from home for possible seizures. Patient reports waking up normally but having heaviness in her chest (retrosternal but no radiation). She then started feeling tingling of her hands, arms, feet, and legs; felt weak and could not move her body - associated dizziness. EMS noted some clonic movements; episode resolved after arrival to ED. Patient admitted for further medical work-up. Psychiatry consulted for anxiety.    C/L Psychiatry Note:  Chart reviewed and patient evaluated - accompanied by sister at bedside. Patient is AAOx4 and lying in bed. Throughout the interview, patient appeared preoccupied regarding perceptions of her mental state, perseveratively stating "I'm normal, you see" and "I'm not crazy." Patient reports a lifelong history of mild anxiety, which has progressively worsened over the past three years. She is not the greatest historian, noting that she sought psychiatric care and therapy two years ago but is unable to recall details regarding her prior psychiatrist or medications prescribed. However, she believes that the medications were ineffective. 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 She does describe speaking with her mother, whom resides in [facility], on a daily basis. In these conversations, her mother is persistent on seeing the patient - and wants her to move there. She identifies as her mother’s primary support person and is frequently involved as an intermediary during parental conflicts, which adds to her stress. patient identifies sleep as a major concern, reporting difficulty falling and staying asleep, with an average of only two hours of restful sleep per night. Although she denies any substance abuse, she acknowledges heavy caffeine consumption, drinking approximately four large cups of coffee per day. Denies any other mood issues. Unable to elicit any delusions or paranoia; denies any hallucinations. Denies any suicidal ideation, intent, or plan.

## 2024-10-07 NOTE — BH CONSULTATION LIAISON ASSESSMENT NOTE - CURRENT INTENT:
CARE TRANSITIONS (HRTIC)    Attempted to contact patient regarding enrollment in Care Transitions program post discharge. Unable to leave Left voicemail. Will attempt at a later date/time.  
None known
Juana Dougherty  (RN)  2021 15:49:01

## 2024-10-07 NOTE — BH CONSULTATION LIAISON ASSESSMENT NOTE - NSSUICPROTFACT_PSY_ALL_CORE
Responsibility to children, family, or others/Identifies reasons for living/Supportive social network of family or friends/Cultural, spiritual and/or moral attitudes against suicide/Positive therapeutic relationships/[employer] beliefs/Beloved pets

## 2024-10-07 NOTE — BH CONSULTATION LIAISON ASSESSMENT NOTE - NSBHCHARTREVIEWLAB_PSY_A_CORE FT
Complete Blood Count + Automated Diff (10.05.24 @ 06:00)   WBC Count: 7.93 K/uL  RBC Count: 3.96 M/uL  Hemoglobin: 12.2 g/dL  Hematocrit: 36.1 %  Mean Cell Volume: 91.2 fl  Mean Cell Hemoglobin: 30.8 pg  Mean Cell Hemoglobin Conc: 33.8 gm/dL  Red Cell Distrib Width: 12.5 %  Platelet Count - Automated: 297 K/uL  Auto Neutrophil #: 4.03 K/uL  Auto Lymphocyte #: 3.07 K/uL  Auto Monocyte #: 0.70 K/uL  Auto Eosinophil #: 0.07 K/uL  Auto Basophil #: 0.05 K/uL  Auto Neutrophil %: 50.9: Differential percentages must be correlated with absolute numbers for   clinical significance. %  Auto Lymphocyte %: 38.7 %  Auto Monocyte %: 8.8 %  Auto Eosinophil %: 0.9 %  Auto Basophil %: 0.6 %  Auto Immature Granulocyte %: 0.1: (Includes meta, myelo and promyelocytes). Mild elevations in immature   granulocytes may be seen with many inflammatory processes and pregnancy;   clinical correlation suggested. %  Nucleated RBC: 0 /100 WBCs    Basic Metabolic Panel in AM (10.06.24 @ 06:46)   Sodium: 143 mmol/L  Potassium: 3.6 mmol/L  Chloride: 107 mmol/L  Carbon Dioxide: 25 mmol/L  Anion Gap: 11 mmol/L  Blood Urea Nitrogen: 11 mg/dL  Creatinine: 0.87 mg/dL  Glucose: 105 mg/dL  Calcium: 9.6 mg/dL  eGFR: 79:

## 2024-10-07 NOTE — BH CONSULTATION LIAISON ASSESSMENT NOTE - NSBHMSEGROOM_PSY_A_CORE
"  SUBJECTIVE:   Clara Parkinson is a 23 year old female who presents to clinic today for the following   health issues:    Anxiety Follow-Up    Status since last visit: Worsened not seen for a while    Other associated symptoms:None    Complicating factors:   Significant life event: No   Current substance abuse: None  Depression symptoms: No  MAT-7 SCORE 4/22/2019   Total Score 8       MAT-7    Amount of exercise or physical activity: 2-3 days/week for an average of 15-30 minutes    Problems taking medications regularly: No    Medication side effects: none    Diet: regular (no restrictions)    Worried when she does anything in front of people.    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    OBJECTIVE:     /85   Pulse 90   Temp 97  F (36.1  C) (Oral)   Ht 1.676 m (5' 6\")   Wt 101.6 kg (224 lb)   LMP 04/20/2019   SpO2 99%   BMI 36.15 kg/m    Body mass index is 36.15 kg/m .  GENERAL: healthy, alert and no distress  RESP: lungs clear to auscultation - no rales, rhonchi or wheezes  CV: regular rate and rhythm, normal S1 S2, no S3 or S4, no murmur, click or rub, no peripheral edema and peripheral pulses strong  MS: no gross musculoskeletal defects noted, no edema  SKIN: no suspicious lesions or rashes  PSYCH: inattentive " and anxious  MAT-7 SCORE 4/22/2019   Total Score 8         ASSESSMENT/PLAN:       ICD-10-CM    1. Performance anxiety F41.8 propranolol (INDERAL) 20 MG tablet     Will trial propranolol as needed for performance anxiety.    Discussed BP may decrease with this  OK to drink when she is taking this.    F/u if persists or worsens.      See Patient Instructions    JOSESITO Stout [facility]       Good

## 2024-10-07 NOTE — BH CONSULTATION LIAISON ASSESSMENT NOTE - SUMMARY
Patient is a 53 year old female with a PMH of insomnia. Prior to calling EMS, patient experiencing tingling, weakness, and dizziness; EMS noted clonic movements. Admitted for further work up and psychiatry consulted for anxiety.     Patient endorses hx of longstanding anxiety; worsening with panic attacks over the last 3 years. Also reports insomnia; only sleeping up to 2 hours a night. Patient also acknowledges caffeine overuse and admits to drinking 4 large cups of coffee a day.    Patient is a 53 year old female with a PMH of insomnia. Prior to calling EMS, patient experiencing tingling, weakness, and dizziness; EMS noted clonic movements. Admitted for further work up and psychiatry consulted for anxiety.     Patient endorses hx of longstanding anxiety; worsening with panic attacks over the last 3 years. Also reports insomnia; only sleeping up to 2 hours a night. Patient also acknowledges caffeine overuse and admits to drinking 4 large cups of coffee a day. Reports events leading up to clinical presentation could be related to panic attack, as she was experiencing increasing anxiety.
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
## 2024-10-07 NOTE — EEG REPORT - NS EEG TEXT BOX
[facility]   COMPREHENSIVE EPILEPSY CENTER   REPORT OF ROUTINE EEG W/ Video     [facility]: [address], [place] [address], Ph#: [phone]  [facility]: [address], [place] [address], Ph#: [phone]  [facility]: [address] [place] [address], Ph#: [phone]    Patient Name: TORREY SHAFFER  Age and : 53y ([birth year])  MRN #: [id number]  Location: [address]  Referring Physician: Adi Johnson    Study Date: 10-07-24    _____________________________________________________________  TECHNICAL INFORMATION    Placement and Labeling of Electrodes:  The EEG was performed utilizing 20 channels referential EEG connections (coronal over temporal over parasagittal montage) using all standard 10-20 electrode placements with EKG.  Recording was at a sampling rate of 256 samples per second per channel.  Time synchronized digital video recording was done simultaneously with EEG recording.  A low light infrared camera was used for low light recording.  Braxton and seizure detection algorithms were utilized.    _____________________________________________________________  HISTORY    Patient is a 53y old  Female who presents with a chief complaint of presyncopy (07 Oct 2024 07:41)      PERTINENT MEDICATION:  MEDICATIONS  (STANDING):  pantoprazole    Tablet 40 milliGRAM(s) Oral before breakfast  sodium chloride 0.9%. 1000 milliLiter(s) (100 mL/Hr) IV Continuous <Continuous>    _____________________________________________________________  STUDY INTERPRETATION    Findings: The background was continuous, spontaneously variable and reactive. During wakefulness, the posterior dominant rhythm consisted of symmetric, well-modulated 9Hz activity, with amplitude to 30 uV, that attenuated to eye opening.  Low amplitude frontal beta was noted in wakefulness.    Background Slowing:  No generalized background slowing was present.    Focal Slowing:   None were present.    Sleep Background:  Drowsiness was characterized by fragmentation, attenuation, and slowing of the background activity.    Sleep was characterized by the presence of vertex waves, symmetric sleep spindles and K-complexes.    Other Non-Epileptiform Findings:  None were present.    Interictal Epileptiform Activity:   None were present.    Events:  Clinical events: None recorded.  Seizures: None recorded.    Activation Procedures:   Hyperventilation was not performed.    Photic stimulation was performed and did not elicit any abnormality.     Artifacts:  Intermittent myogenic and movement artifacts were noted.    _____________________________________________________________  EEG SUMMARY/CLASSIFICATION    Normal EEG   _____________________________________________________________  EEG IMPRESSION/CLINICAL CORRELATE    Normal EEG study.  No epileptiform pattern or seizure seen.    Alex Greer MD  EEG/Epilepsy Attending

## 2024-10-07 NOTE — BH CONSULTATION LIAISON ASSESSMENT NOTE - NSBHCONSULTRECOMMENDOTHER_PSY_A_CORE FT
- Patient reports having more good days than days impacted by anxiety; however, when her anxiety does occur, it is often severe and accompanied by palpitations. Although she acknowledges anxiety as a concern, she identifies poor sleep as a more prominent issue, noting difficulty falling and staying asleep, often sleeping only 2 hours per night. It is plausible that her inadequate sleep is exacerbating her anxiety. Recommend a trial of Trazodone 25mg qhs for anxiety and insomnia.  - Patient expresses that she recognizes that her caffeine intake is likely contributing to poor circadian rhythm and anxiety. Reports she is actively trying to cut back on caffeine during this hospitalization.      - Appreciate neurology consult, no significant findings, and there is a concern that the symptoms may be due to a panic attack if seizures are ruled out.  - Patient reports having more good days than days impacted by anxiety; however, when her anxiety does occur, it is often severe and accompanied by palpitations. Although she acknowledges anxiety as a concern, she identifies poor sleep as a more prominent issue, noting difficulty falling and staying asleep, often sleeping only 2 hours per night. It is plausible that her inadequate sleep is exacerbating her anxiety. Recommend a trial of Trazodone 25mg qhs for anxiety and insomnia.  - Patient expresses that she recognizes that her caffeine intake is likely contributing to poor circadian rhythm and anxiety. Reports she is actively trying to cut back on caffeine during this hospitalization.

## 2024-10-07 NOTE — BH CONSULTATION LIAISON ASSESSMENT NOTE - DESCRIPTION
Patient is one of 10 siblings; large family.  for > 25 years but unable to have children. She is from [place] and her parents still live there. Is a  and spends time living between [place] and caring for her parents in [facility].

## 2024-10-07 NOTE — BH CONSULTATION LIAISON ASSESSMENT NOTE - CURRENT MEDICATION
MEDICATIONS  (STANDING):  pantoprazole    Tablet 40 milliGRAM(s) Oral before breakfast  sodium chloride 0.9%. 1000 milliLiter(s) (100 mL/Hr) IV Continuous <Continuous>    MEDICATIONS  (PRN):  acetaminophen     Tablet .. 650 milliGRAM(s) Oral every 6 hours PRN Temp greater or equal to 38C (100.4F), Mild Pain (1 - 3)  ALPRAZolam 0.5 milliGRAM(s) Oral daily PRN anxiety  aluminum hydroxide/magnesium hydroxide/simethicone Suspension 30 milliLiter(s) Oral every 4 hours PRN Dyspepsia  melatonin 3 milliGRAM(s) Oral at bedtime PRN Insomnia  ondansetron Injectable 4 milliGRAM(s) IV Push every 8 hours PRN Nausea and/or Vomiting

## 2024-10-07 NOTE — PROGRESS NOTE ADULT - ASSESSMENT
53 y o f  with  PMH of insomnia,  BIBA from home with possible seizure, pt reports waking up normal today around noon felt heaviness in her chest, retrosternal, no radiation, then started feeling tingling of her hands arms and feet anf then legs, felt weak and could not move her body, she called her sister who called EMS. episode assocated with dizziness. EMS noted some clonic movements of the body. PT  does not remember clearly  what happened in the ambulance,   episode resolved after arrival to ED.    #weakness, presyncopal episode, r/o Seizure episode  #lactic acidosis- resolved with IVF  admit to tele  cardiac monitoring  cta head, neck- no acute changes   trend trops  TTE done  cardio recs appreciated  -psych consult for AM  -neuro input appreciated pending EEG,  -PT consult: Outpt PT  neuro checks q4.  EEG:   asp precautions  IV fluids- received 1 LNS in ED, will continue ivf    #Hypokalemia(Resolved)  replete  monitor    #Insomnia  -Says melatonin does not help at all  -Increase xanax 0.25 BID.     #GERD   ppi  aluminion hydroxide/ simeticone solution     #VTE PPX   PAS      care plan d/w pt at bedside, all questions answered       53 y o f  with  PMH of insomnia,  BIBA from home with possible seizure, pt reports waking up normal today around noon felt heaviness in her chest, retrosternal, no radiation, then started feeling tingling of her hands arms and feet anf then legs, felt weak and could not move her body, she called her sister who called EMS. episode assocated with dizziness. EMS noted some clonic movements of the body. PT  does not remember clearly  what happened in the ambulance,   episode resolved after arrival to ED.    #weakness, presyncopal episode, r/o Seizure episode  #lactic acidosis- resolved with IVF  -Admit to tele  -Cardiac monitoring  -CTA head, neck- no acute changes   -Trend trops  -TTE done  -Cardio recs appreciated  -Psych consult for AM  -Neuro input appreciated pending EEG,  -PT consult: Outpt PT  -EEG:   IV fluids- received 1 LNS in ED, will continue ivf    #Hypokalemia(Resolved)  replete  monitor    #Insomnia  #Anxiety  -Says melatonin does not help at all  -Increase xanax 0.25 BID.     #GERD   ppi  aluminion hydroxide/ simeticone solution     #VTE PPX   PAS      care plan d/w pt at bedside, all questions answered       53 y o f  with  PMH of insomnia,  BIBA from home with possible seizure, pt reports waking up normal today around noon felt heaviness in her chest, retrosternal, no radiation, then started feeling tingling of her hands arms and feet anf then legs, felt weak and could not move her body, she called her sister who called EMS. episode assocated with dizziness. EMS noted some clonic movements of the body. PT  does not remember clearly  what happened in the ambulance,   episode resolved after arrival to ED.    #weakness, presyncopal episode, r/o Seizure episode  #lactic acidosis- resolved with IVF  -Admit to tele  -Cardiac monitoring  -CTA head, neck- no acute changes   -Trend trops  -TTE done  -Cardio recs appreciated  -Psych consult: Trazodone 25mg trial   -Neuro input appreciated pending EEG,  -PT consult: Outpt PT  -EEG: No epileptogenic waves noted  IV fluids- received 1 LNS in ED, will continue ivf    #Hypokalemia(Resolved)  replete  monitor    #Insomnia  #Anxiety  -Says melatonin does not help at all  -Increase xanax 0.25 BID.     #GERD   ppi  aluminion hydroxide/ simeticone solution     #VTE PPX   PAS      care plan d/w pt at bedside, all questions answered

## 2024-10-07 NOTE — BH CONSULTATION LIAISON ASSESSMENT NOTE - OTHER PAST PSYCHIATRIC HISTORY (INCLUDE DETAILS REGARDING ONSET, COURSE OF ILLNESS, INPATIENT/OUTPATIENT TREATMENT)
Patient reports longstanding history of mild anxiety; but recently worsening - now experiencing panic attacks monthly. Reports being evaluated by a Psychiatrist (unknown) and seeing a therapist Anne Gonzalez in [place], [place].

## 2024-10-07 NOTE — DISCHARGE NOTE NURSING/CASE MANAGEMENT/SOCIAL WORK - NSDCFUADDAPPT_GEN_ALL_CORE_FT
APPTS ARE READY TO BE MADE: [x] YES  Best Family or Patient Contact (if needed):  Additional Information about above appointments (if needed):  1: Cardiologist  2: Psychiatrist  3: We made you a hospital followup appointment with Dr. Miranda on 10/10/24 at 12:00pm, office #[phone].    Other comments or requests:

## 2024-10-07 NOTE — BH CONSULTATION LIAISON ASSESSMENT NOTE - NSBHCHARTREVIEWINVESTIGATE_PSY_A_CORE FT
< from: 12 Lead ECG (10.05.24 @ 16:06) >      Ventricular Rate 74 BPM    Atrial Rate 74 BPM    P-R Interval 122 ms    QRS Duration 88 ms    Q-T Interval 414 ms    QTC Calculation(Bazett) 459 ms    P Axis 62 degrees    R Axis 27 degrees    T Axis 44 degrees    Diagnosis Line Normal sinus rhythm  Normal ECG  When compared with ECG of 05-OCT-2024 08:12,  No significant change was found  Confirmed by ALTAGRACIA HAMILTON MD ([id number]) on 10/6/2024 2:09:54 PM    < end of copied text >      < from: CT Head No Cont (10.04.24 @ 17:05) >      ACC: [id number] EXAM:  CT ANGIO NECK (W)AW IC   ORDERED BY: DOMINIK FARLEY     ACC: [id number] EXAM:  CT ANGIO BRAIN (W)AW IC   ORDERED BY: DOMINIK FARLEY     ACC: [id number] EXAM:  CT BRAIN   ORDERED BY: DOMINIK FARLEY     PROCEDURE DATE:  10/04/2024          INTERPRETATION:  CLINICAL INFORMATION: generalized weakness; pmhx of   insomnia p/w episode of "I cant move and feeling w    COMPARISON: MRI August 10, 2022.    Multiple axial sections were performed from the base skull to vertex   without contrast enhancement. Coronal and sagittal reconstructions were   performed    The patient was injected approximately 90 cc of Omnipaque C3 50 IV. CTA   of neck and Ambler of [place] was performed. Coronal and sagittal   reconstructions were performed. 3-D MIPS were performed    The lateral ventricles have a normal configuration    There is no acute hemorrhage mass or mass effect seen.    Evaluation of the osseous structures with the appropriate window appears   normal    The visualized paranasal sinuses mastoid and middle ear regions are clear.    Both distal common carotid, proximal internal and external carotid   arteries appear normal as well as both vertebral arteries. 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 Elevation of the anterior   cerebral artery appears normal    Both middle cerebral arteries appear normal as well as the basilar and   bilateral posterior cerebral arteries    The dural venous sinuses demonstrate normal enhancement    Evaluation of the soft tissue neck region appears normal    The visualized portions of both lung apices appear clear.    IMPRESSION: Unremarkable noncontrast head CT, CTA of the neck and Ambler   of [place].        --- End of Report ---            KIM ESPINOZA MD; Attending Radiologist  This document has been electronically signed. Oct  4 2024  5:30PM    < end of copied text >

## 2024-10-07 NOTE — BH CONSULTATION LIAISON ASSESSMENT NOTE - NSBHCHARTREVIEWVS_PSY_A_CORE FT
Vital Signs Last 24 Hrs  T(C): 37 (07 Oct 2024 05:16), Max: 37 (07 Oct 2024 05:16)  T(F): 98.6 (07 Oct 2024 05:16), Max: 98.6 (07 Oct 2024 05:16)  HR: 73 (07 Oct 2024 05:16) (73 - 102)  BP: 113/74 (07 Oct 2024 05:16) (110/65 - 113/74)  BP(mean): --  RR: 18 (07 Oct 2024 05:16) (18 - 18)  SpO2: 97% (07 Oct 2024 05:16) (97% - 97%)    Parameters below as of 07 Oct 2024 05:16  Patient On (Oxygen Delivery Method): room air

## 2024-10-07 NOTE — DISCHARGE NOTE NURSING/CASE MANAGEMENT/SOCIAL WORK - PATIENT PORTAL LINK FT
You can access the FollowMyHealth Patient Portal offered by [facility] by registering at the following website: [email or web address]. By joining [employer]’s FollowMyHealth portal, you will also be able to view your health information using other applications (apps) compatible with our system.

## 2024-10-07 NOTE — BH CONSULTATION LIAISON ASSESSMENT NOTE - NSBHCONSULTFOLLOWAFTERCARE_PSY_A_CORE FT
Recommend outpatient psychiatric follow-up. Can follow up with previous providers associated with [facility]. Alternatively, recommend [facility] [place] ([phone]) or providers in network with her insurance.

## 2024-11-20 ENCOUNTER — OUTPATIENT (OUTPATIENT)
Dept: OUTPATIENT SERVICES | Facility: HOSPITAL | Age: 54
LOS: 1 days | End: 2024-11-20
Payer: COMMERCIAL

## 2024-11-20 ENCOUNTER — APPOINTMENT (OUTPATIENT)
Dept: RADIOLOGY | Facility: HOSPITAL | Age: 54
End: 2024-11-20
Payer: COMMERCIAL

## 2024-11-20 ENCOUNTER — APPOINTMENT (OUTPATIENT)
Dept: ULTRASOUND IMAGING | Facility: HOSPITAL | Age: 54
End: 2024-11-20
Payer: COMMERCIAL

## 2024-11-20 ENCOUNTER — APPOINTMENT (OUTPATIENT)
Dept: RADIOLOGY | Facility: HOSPITAL | Age: 54
End: 2024-11-20

## 2024-11-20 DIAGNOSIS — Z00.8 ENCOUNTER FOR OTHER GENERAL EXAMINATION: ICD-10-CM

## 2024-11-20 DIAGNOSIS — K40.90 UNILATERAL INGUINAL HERNIA, WITHOUT OBSTRUCTION OR GANGRENE, NOT SPECIFIED AS RECURRENT: Chronic | ICD-10-CM

## 2024-11-20 PROCEDURE — 76700 US EXAM ABDOM COMPLETE: CPT

## 2024-11-20 PROCEDURE — 71046 X-RAY EXAM CHEST 2 VIEWS: CPT | Mod: 26

## 2024-11-20 PROCEDURE — 71046 X-RAY EXAM CHEST 2 VIEWS: CPT

## 2024-11-20 PROCEDURE — 76536 US EXAM OF HEAD AND NECK: CPT | Mod: 26

## 2024-11-20 PROCEDURE — 76700 US EXAM ABDOM COMPLETE: CPT | Mod: 26

## 2024-11-20 PROCEDURE — 76536 US EXAM OF HEAD AND NECK: CPT

## 2024-12-20 ENCOUNTER — APPOINTMENT (OUTPATIENT)
Dept: RADIOLOGY | Facility: HOSPITAL | Age: 54
End: 2024-12-20
Payer: COMMERCIAL

## 2024-12-20 ENCOUNTER — APPOINTMENT (OUTPATIENT)
Dept: MAMMOGRAPHY | Facility: HOSPITAL | Age: 54
End: 2024-12-20
Payer: COMMERCIAL

## 2024-12-20 ENCOUNTER — APPOINTMENT (OUTPATIENT)
Dept: ULTRASOUND IMAGING | Facility: HOSPITAL | Age: 54
End: 2024-12-20
Payer: COMMERCIAL

## 2024-12-20 ENCOUNTER — OUTPATIENT (OUTPATIENT)
Dept: OUTPATIENT SERVICES | Facility: HOSPITAL | Age: 54
LOS: 1 days | End: 2024-12-20
Payer: COMMERCIAL

## 2024-12-20 DIAGNOSIS — K40.90 UNILATERAL INGUINAL HERNIA, WITHOUT OBSTRUCTION OR GANGRENE, NOT SPECIFIED AS RECURRENT: Chronic | ICD-10-CM

## 2024-12-20 DIAGNOSIS — Z00.8 ENCOUNTER FOR OTHER GENERAL EXAMINATION: ICD-10-CM

## 2024-12-20 PROCEDURE — 77067 SCR MAMMO BI INCL CAD: CPT | Mod: 26

## 2024-12-20 PROCEDURE — 76641 ULTRASOUND BREAST COMPLETE: CPT | Mod: 26,50

## 2024-12-20 PROCEDURE — 77080 DXA BONE DENSITY AXIAL: CPT | Mod: 26

## 2024-12-20 PROCEDURE — 77063 BREAST TOMOSYNTHESIS BI: CPT | Mod: 26

## 2024-12-20 PROCEDURE — 77063 BREAST TOMOSYNTHESIS BI: CPT

## 2024-12-20 PROCEDURE — 76641 ULTRASOUND BREAST COMPLETE: CPT

## 2024-12-20 PROCEDURE — 77067 SCR MAMMO BI INCL CAD: CPT

## 2024-12-20 PROCEDURE — 77080 DXA BONE DENSITY AXIAL: CPT

## 2024-12-24 ENCOUNTER — APPOINTMENT (OUTPATIENT)
Dept: ENDOCRINOLOGY | Facility: CLINIC | Age: 54
End: 2024-12-24
Payer: COMMERCIAL

## 2024-12-24 VITALS
HEART RATE: 75 BPM | BODY MASS INDEX: 21.85 KG/M2 | DIASTOLIC BLOOD PRESSURE: 68 MMHG | WEIGHT: 128 LBS | OXYGEN SATURATION: 100 % | SYSTOLIC BLOOD PRESSURE: 116 MMHG | HEIGHT: 64 IN | TEMPERATURE: 97.5 F | RESPIRATION RATE: 16 BRPM

## 2024-12-24 DIAGNOSIS — E04.2 NONTOXIC MULTINODULAR GOITER: ICD-10-CM

## 2024-12-24 DIAGNOSIS — R76.8 OTHER SPECIFIED ABNORMAL IMMUNOLOGICAL FINDINGS IN SERUM: ICD-10-CM

## 2024-12-24 PROCEDURE — 99204 OFFICE O/P NEW MOD 45 MIN: CPT

## 2025-01-07 ENCOUNTER — APPOINTMENT (OUTPATIENT)
Dept: PULMONOLOGY | Facility: CLINIC | Age: 55
End: 2025-01-07

## 2025-07-21 ENCOUNTER — APPOINTMENT (OUTPATIENT)
Dept: OBGYN | Facility: CLINIC | Age: 55
End: 2025-07-21

## 2025-07-24 ENCOUNTER — APPOINTMENT (OUTPATIENT)
Dept: OBGYN | Facility: CLINIC | Age: 55
End: 2025-07-24
Payer: COMMERCIAL

## 2025-07-24 VITALS
DIASTOLIC BLOOD PRESSURE: 70 MMHG | TEMPERATURE: 97.3 F | SYSTOLIC BLOOD PRESSURE: 118 MMHG | RESPIRATION RATE: 16 BRPM | WEIGHT: 135 LBS | OXYGEN SATURATION: 99 % | HEIGHT: 64 IN | HEART RATE: 76 BPM | BODY MASS INDEX: 23.05 KG/M2

## 2025-07-24 DIAGNOSIS — Z00.00 ENCOUNTER FOR GENERAL ADULT MEDICAL EXAMINATION W/OUT ABNORMAL FINDINGS: ICD-10-CM

## 2025-07-24 DIAGNOSIS — Z12.39 ENCOUNTER FOR OTHER SCREENING FOR MALIGNANT NEOPLASM OF BREAST: ICD-10-CM

## 2025-07-24 DIAGNOSIS — Z82.49 FAMILY HISTORY OF ISCHEMIC HEART DISEASE AND OTHER DISEASES OF THE CIRCULATORY SYSTEM: ICD-10-CM

## 2025-07-24 DIAGNOSIS — Z12.4 ENCOUNTER FOR SCREENING FOR MALIGNANT NEOPLASM OF CERVIX: ICD-10-CM

## 2025-07-24 DIAGNOSIS — R92.30 DENSE BREASTS, UNSPECIFIED: ICD-10-CM

## 2025-07-24 DIAGNOSIS — Z01.419 ENCOUNTER FOR GYNECOLOGICAL EXAMINATION (GENERAL) (ROUTINE) W/OUT ABNORMAL FINDINGS: ICD-10-CM

## 2025-07-24 PROCEDURE — 81003 URINALYSIS AUTO W/O SCOPE: CPT | Mod: QW

## 2025-07-24 PROCEDURE — 99386 PREV VISIT NEW AGE 40-64: CPT

## 2025-07-27 LAB — HPV HIGH+LOW RISK DNA PNL CVX: NOT DETECTED

## 2025-07-30 LAB — CYTOLOGY CVX/VAG DOC THIN PREP: ABNORMAL
